# Patient Record
Sex: MALE | Race: WHITE | NOT HISPANIC OR LATINO | Employment: FULL TIME | URBAN - METROPOLITAN AREA
[De-identification: names, ages, dates, MRNs, and addresses within clinical notes are randomized per-mention and may not be internally consistent; named-entity substitution may affect disease eponyms.]

---

## 2019-11-18 ENCOUNTER — OFFICE VISIT - HEALTHEAST (OUTPATIENT)
Dept: FAMILY MEDICINE | Facility: CLINIC | Age: 34
End: 2019-11-18

## 2019-11-18 DIAGNOSIS — H93.13 TINNITUS, BILATERAL: ICD-10-CM

## 2019-11-18 DIAGNOSIS — Z02.4 ENCOUNTER FOR DEPARTMENT OF TRANSPORTATION (DOT) EXAMINATION FOR DRIVING LICENSE RENEWAL: ICD-10-CM

## 2019-11-18 LAB
ALBUMIN UR-MCNC: NEGATIVE MG/DL
APPEARANCE UR: CLEAR
BILIRUB UR QL STRIP: NEGATIVE
COLOR UR AUTO: YELLOW
GLUCOSE UR STRIP-MCNC: NEGATIVE MG/DL
HGB UR QL STRIP: NEGATIVE
KETONES UR STRIP-MCNC: NEGATIVE MG/DL
LEUKOCYTE ESTERASE UR QL STRIP: NEGATIVE
NITRATE UR QL: NEGATIVE
PH UR STRIP: 6.5 [PH] (ref 5–8)
SP GR UR STRIP: 1.02 (ref 1–1.03)
UROBILINOGEN UR STRIP-ACNC: NORMAL

## 2019-11-18 ASSESSMENT — MIFFLIN-ST. JEOR: SCORE: 1617.58

## 2020-03-25 ENCOUNTER — OFFICE VISIT - HEALTHEAST (OUTPATIENT)
Dept: FAMILY MEDICINE | Facility: CLINIC | Age: 35
End: 2020-03-25

## 2020-03-25 DIAGNOSIS — R10.13 DYSPEPSIA: ICD-10-CM

## 2020-03-25 DIAGNOSIS — K21.9 GASTROESOPHAGEAL REFLUX DISEASE WITHOUT ESOPHAGITIS: ICD-10-CM

## 2020-05-03 ENCOUNTER — OFFICE VISIT - HEALTHEAST (OUTPATIENT)
Dept: FAMILY MEDICINE | Facility: CLINIC | Age: 35
End: 2020-05-03

## 2020-05-03 DIAGNOSIS — F41.9 ANXIETY: ICD-10-CM

## 2020-05-03 ASSESSMENT — ANXIETY QUESTIONNAIRES
4. TROUBLE RELAXING: SEVERAL DAYS
2. NOT BEING ABLE TO STOP OR CONTROL WORRYING: SEVERAL DAYS
1. FEELING NERVOUS, ANXIOUS, OR ON EDGE: SEVERAL DAYS
6. BECOMING EASILY ANNOYED OR IRRITABLE: NOT AT ALL
5. BEING SO RESTLESS THAT IT IS HARD TO SIT STILL: SEVERAL DAYS
7. FEELING AFRAID AS IF SOMETHING AWFUL MIGHT HAPPEN: NOT AT ALL
3. WORRYING TOO MUCH ABOUT DIFFERENT THINGS: NOT AT ALL
GAD7 TOTAL SCORE: 4

## 2020-05-04 ENCOUNTER — COMMUNICATION - HEALTHEAST (OUTPATIENT)
Dept: SCHEDULING | Facility: CLINIC | Age: 35
End: 2020-05-04

## 2020-05-05 ENCOUNTER — OFFICE VISIT - HEALTHEAST (OUTPATIENT)
Dept: FAMILY MEDICINE | Facility: CLINIC | Age: 35
End: 2020-05-05

## 2020-05-05 ENCOUNTER — COMMUNICATION - HEALTHEAST (OUTPATIENT)
Dept: FAMILY MEDICINE | Facility: CLINIC | Age: 35
End: 2020-05-05

## 2020-05-05 DIAGNOSIS — F41.1 GENERALIZED ANXIETY DISORDER: ICD-10-CM

## 2020-05-05 DIAGNOSIS — K21.9 GASTROESOPHAGEAL REFLUX DISEASE WITHOUT ESOPHAGITIS: ICD-10-CM

## 2020-05-05 DIAGNOSIS — R10.13 DYSPEPSIA: ICD-10-CM

## 2020-05-05 DIAGNOSIS — F51.02 ADJUSTMENT INSOMNIA: ICD-10-CM

## 2020-05-05 RX ORDER — HYDROXYZINE HYDROCHLORIDE 50 MG/1
25-50 TABLET, FILM COATED ORAL 2 TIMES DAILY PRN
Qty: 30 TABLET | Refills: 2 | Status: SHIPPED | OUTPATIENT
Start: 2020-05-05 | End: 2021-12-22

## 2020-06-02 ENCOUNTER — OFFICE VISIT - HEALTHEAST (OUTPATIENT)
Dept: FAMILY MEDICINE | Facility: CLINIC | Age: 35
End: 2020-06-02

## 2020-06-02 DIAGNOSIS — J30.1 SEASONAL ALLERGIC RHINITIS DUE TO POLLEN: ICD-10-CM

## 2020-06-02 DIAGNOSIS — F51.01 PRIMARY INSOMNIA: ICD-10-CM

## 2020-06-02 DIAGNOSIS — K21.9 GASTROESOPHAGEAL REFLUX DISEASE WITHOUT ESOPHAGITIS: ICD-10-CM

## 2020-06-02 DIAGNOSIS — F41.1 GENERALIZED ANXIETY DISORDER: ICD-10-CM

## 2020-09-06 ENCOUNTER — COMMUNICATION - HEALTHEAST (OUTPATIENT)
Dept: FAMILY MEDICINE | Facility: CLINIC | Age: 35
End: 2020-09-06

## 2020-09-06 DIAGNOSIS — K21.9 GASTROESOPHAGEAL REFLUX DISEASE WITHOUT ESOPHAGITIS: ICD-10-CM

## 2020-09-06 DIAGNOSIS — R10.13 DYSPEPSIA: ICD-10-CM

## 2020-10-20 ENCOUNTER — OFFICE VISIT - HEALTHEAST (OUTPATIENT)
Dept: FAMILY MEDICINE | Facility: CLINIC | Age: 35
End: 2020-10-20

## 2020-10-20 DIAGNOSIS — J32.9 PURULENT POSTNASAL DRAINAGE: ICD-10-CM

## 2020-10-20 DIAGNOSIS — K21.9 GASTROESOPHAGEAL REFLUX DISEASE WITHOUT ESOPHAGITIS: ICD-10-CM

## 2020-10-20 DIAGNOSIS — F41.1 GENERALIZED ANXIETY DISORDER: ICD-10-CM

## 2020-11-02 ENCOUNTER — RECORDS - HEALTHEAST (OUTPATIENT)
Dept: ADMINISTRATIVE | Facility: OTHER | Age: 35
End: 2020-11-02

## 2021-01-09 ENCOUNTER — HEALTH MAINTENANCE LETTER (OUTPATIENT)
Age: 36
End: 2021-01-09

## 2021-05-28 ASSESSMENT — ANXIETY QUESTIONNAIRES: GAD7 TOTAL SCORE: 4

## 2021-06-03 VITALS
SYSTOLIC BLOOD PRESSURE: 110 MMHG | HEIGHT: 66 IN | TEMPERATURE: 98.3 F | BODY MASS INDEX: 26.03 KG/M2 | WEIGHT: 162 LBS | DIASTOLIC BLOOD PRESSURE: 66 MMHG | HEART RATE: 78 BPM | OXYGEN SATURATION: 98 % | RESPIRATION RATE: 14 BRPM

## 2021-06-03 NOTE — PROGRESS NOTES
"1. Encounter for Department of Transportation (DOT) examination for driving license renewal  Urinalysis Macroscopic   2. Tinnitus, bilateral  Ambulatory referral to Audiology         Plan: Qualifies for 2 year certificate. No restrictions.     Vision: uncorrected    Right: 20/16  Left:20/20  Both: 20/16  Horizontal FOV: 95 degrees bilaterally  Whisper test:  Passed over 5 feet bilaterally  Ishihara color test: passed    Subjective  Alireza Jung is a 34 y.o. male who presents for DOT physical.  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 Ringing in ears bilaterally for several years. He notices it more at night when he is trying to sleep. History of listening to loud music when he was a . He would like a referral to see a audiologist for formal hearing evaluation. Up to date with vaccines.  No signs are stable today, he has no other concerns.  Vision is uncorrected.  He is a social drinker, 2-4 drinks max per week.  He is a former cigar smoker who quit in 2014.  Denies any illicit drug use.  He takes no prescription medications.  He has no known allergies.  He currently has no primary care provider, his previous PCP retired.  He will look into establishing care with another provider or myself.     Review of Medical History:     Diabetes: No  Neuropathy: No  COPD: No  Asthma: No  Hernia: No  ADHD: No  Bipolar: No  Depression: No  Anxiety: No  Insomnia:  No  Allergies:  No  Sleep apnea: No  ETOH: socially  Drug Abuse: No  Smoker: Former smoking, quit 2014  Coumadin: No  Heart Disease: No  Atrial Fib: No  Hypertension: No  Pacemaker: No  ICD: No  Hearing aides: No  Seizures: No  Vertigo/Menieres: No   Narcolepsy: No            Objective   /66   Pulse 78   Temp 98.3  F (36.8  C)   Resp 14   Ht 5' 6\" (1.676 m)   Wt 162 lb (73.5 kg)   SpO2 98%   BMI 26.15 kg/m    No current outpatient medications on file.     No current facility-administered medications for this visit.  "       Gen: alert, no acute distress  Head;  Atraumatic, normalcephalic  Eyes:OP clear, pupils equal round reactive to light, fundi appear normal, extraocular movements intact  Ears: No scarring, fluid collection, perforation. Bilaterally TM pearly gray. External canals intact, no swelling or drainage.   Nose: No PND, no secretions  Mouth: No exudate, thrush or erythema.    Neck: supple, no lymphadenopathy, thyroid normal, full ROM  CV: RRR, no murmur, no carotid bruits, no gallop or rub, no edema. Femoral, pedal and post-tibial pulses intact, 2+  Resp: CTAB, no wheeze/crackles, no increased work of breathing  Abd: normal bowel sounds, soft, non-tender, non-distended, no masses, no CVA tenderness  : normal external male genitalia, no hernia.  Ext: warm, dry, no cyanosis  Neuro:  Reflexes normal and symmetric in bilateral upper and lower extremities, equal  strength, face symmetrical, negative Romberg,  strength equal  Skin: Warm and dry, no prior surgical scars, no lesions or rashes

## 2021-06-04 VITALS — BODY MASS INDEX: 27.12 KG/M2 | WEIGHT: 168 LBS

## 2021-06-04 VITALS — WEIGHT: 167 LBS | BODY MASS INDEX: 26.95 KG/M2

## 2021-06-07 NOTE — PROGRESS NOTES
"Alireza Jung is a 35 y.o. male who is being evaluated via a billable telephone visit.      The patient has been notified of following:     \"This telephone visit will be conducted via a call between you and your physician/provider. We have found that certain health care needs can be provided without the need for a physical exam.  This service lets us provide the care you need with a short phone conversation.  If a prescription is necessary we can send it directly to your pharmacy.  If lab work is needed we can place an order for that and you can then stop by our lab to have the test done at a later time.    If during the course of the call the physician/provider feels a telephone visit is not appropriate, you will not be charged for this service.\"         Alireza Jung complains of    Chief Complaint   Patient presents with     Gastroesophageal Reflux     Patient has been having acid reflux with heart burn for the past four months, recently getting worse. Feels like his stomach is sore. Other symptoms include nausea off and on and loose stools that were darker in color. No fevers. Patient wonders about stomach ulcers - having a stressful past few months (father passed away in Sept. 2019 and currently separting from his spouse).        I have reviewed and updated the patient's Past Medical History, Social History, Family History and Medication List.    ALLERGIES  Patient has no known allergies.    Additional provider notes: See below    Assessment/Plan:  1. Gastroesophageal reflux disease without esophagitis     - omeprazole (PRILOSEC) 20 MG capsule; Take 1 capsule (20 mg total) by mouth daily before breakfast.  Dispense: 45 capsule; Refill: 1  I talked to him about doing a stool collection for H. pylori antigen but he wanted to postpone that because he is fearful to come into the clinic and his try to quarantine himself.  Recommended eating small more frequent meals and to not eat for 2 hours prior to going to " bed.  Try to elevate the head of the bed 4 to 6 inches  If symptoms not improved in a week or if having more dark stools will need to do some additional testing.  The patient is aware that starting the PPI will interfere with some of the testing for H. pylori other than a upper endoscopy.  If he has recurrent symptoms like this when he goes off the PPI certainly doing a test for diagnosing H. pylori would be very important.      2. Dyspepsia     - omeprazole (PRILOSEC) 20 MG capsule; Take 1 capsule (20 mg total) by mouth daily before breakfast.  Dispense: 45 capsule; Refill: 1  Limit caffeine and alcohol      Phone call duration:  16 minutes    RAINA Rubio MD      Telephone encounter because of coronavirus pandemic.  Concerns for gastroesophageal reflux, nausea and on 1 or 2 occasions some dark stools.  Questions if he might have an ulcer.  Increased risk due to stress with the death of his father this past year and currently  from his spouse.  Alcohol use on average 8 beers per week has not noticed worsening of his symptoms with drinking alcohol  Caffeine 2 cups/day  Tobacco use none  Nonsteroidal anti-inflammatory medication use none  Remote history of similar symptoms treated with Prilosec with success.    Notices increased burping and sour taste in the back of his mouth  Burning type pain in the chest made worse after eating and also notices symptoms when he is supine  No shortness of breath  Patient has used some Pepto-Bismol did but did not feel there was a timing relationship between the use of Pepto-Bismol and the dark stools.  No dysphasia or pain with swallowing  Denies lightheadedness dizziness or feeling faint like.    Bobby Lord MD

## 2021-06-07 NOTE — PROGRESS NOTES
"Alireza Jung is a 35 y.o. male who is being evaluated via a billable video visit.      The patient has been notified of following:     \"This video visit will be conducted via a call between you and your physician/provider. We have found that certain health care needs can be provided without the need for an in-person physical exam.  This service lets us provide the care you need with a video conversation.  If a prescription is necessary we can send it directly to your pharmacy.  If lab work is needed we can place an order for that and you can then stop by our lab to have the test done at a later time.    Video visits are billed at different rates depending on your insurance coverage. Please reach out to your insurance provider with any questions.    If during the course of the call the physician/provider feels a video visit is not appropriate, you will not be charged for this service.\"    Patient has given verbal consent to a Video visit? Yes    Patient would like to receive their AVS by AVS Preference: Yvette.    Patient would like the video invitation sent by: Text to cell phone: [phone]     Will anyone else be joining your video visit? No        Video Start Time: 8:58    Additional provider notes:  ASSESSMENT/PLAN:       1. Generalized anxiety disorder     - sertraline (ZOLOFT) 50 MG tablet; Take 25 mg daily for first 4 days and then increase to 50 mg daily  Dispense: 30 tablet; Refill: 5  - hydrOXYzine HCL (ATARAX) 50 MG tablet; Take 0.5-1 tablets (25-50 mg total) by mouth 2 (two) times a day as needed for anxiety (and sleep).  Dispense: 30 tablet; Refill: 2  I shared with the patient the potential side effects from these medications and the reason for with the sertraline starting at a lower dose.  I explained to him that 50 mg of the sertraline is still low low average dose and may need to be increased.  He will try to limit his alcohol usage  Encouraged him to make contact again with the employee " assistance program as he did have 2 or 3 sessions with them.  If he desires to have other behavioral therapy counseling I be happy to help him with that.     2. Adjustment insomnia     - hydrOXYzine HCL (ATARAX) 50 MG tablet; Take 0.5-1 tablets (25-50 mg total) by mouth 2 (two) times a day as needed for anxiety (and sleep).  Dispense: 30 tablet; Refill: 2  The hydroxyzine can be taken at bedtime to help with sleep on a as needed basis.  Exercise could be helpful for his insomnia but if he does exercise to try to do that earlier in the day and not a few hours before he wants to go to sleep.    3. Gastroesophageal reflux disease without esophagitis     - omeprazole (PRILOSEC) 20 MG capsule; Take 1 capsule (20 mg total) by mouth daily before breakfast.  Dispense: 45 capsule; Refill: 1    4. Dyspepsia     - omeprazole (PRILOSEC) 20 MG capsule; Take 1 capsule (20 mg total) by mouth daily before breakfast.  Dispense: 45 capsule; Refill: 1    In the future if the patient needs to continue with omeprazole he may need to have an evaluation for H. pylori.    We will plan another virtual visit in about 4 weeks.    The following are part of a depression follow up plan for the patient:  seen by mental health counselor and mental health treatment assessment    Bobby Lord MD      PROGRESS NOTE   5/5/2020    SUBJECTIVE:  Alireza Jung is a 35 y.o. male  who presents for   Chief Complaint   Patient presents with     Anxiety     going through a seperation, having rising anxiety issues and not sleeping well        1. Generalized anxiety disorder  The patient is concerned about anxiety symptoms that he has been experiencing to be much worse over the last month.  He and his significant other of 10 years are  and they have a 5-year-old son together.  This will be coming to the completion here in the next few weeks and the patient is concerned that his anxiety will escalate further.  He does not feel that he has a chronic  problem with anxiety but more situational.  He is sleeping poorly and having a hard time with memory and concentration.  He does have some panic-like symptoms with emotional distress and occasionally some shortness of breath.  He is not had any suicidal thoughts or intent to hurt himself or anyone else.  His symptoms that he is experiencing started about 5 months ago.  Caffeine is 3 drinks per day  Alcohol 12 beers per week and he does agree that sometimes he will drink alcohol to help with anxiety and help him sleep but he realizes that in the end it really does not help.  In the 2008 the patient was having issues with anxiety and was treated with citalopram and Xanax but he felt the medication was too strong but it was helpful for his anxiety and he took it for possibly 3 months.  Once he got through that stressful situation he did not feel that it was needed.  The patient works at [employer] and [employer].    2. Adjustment insomnia  The patient has difficulties more with staying asleep then getting to sleep but occasionally has trouble with both.  He will often wake up about 130 or 2:00 and then have a hard time falling back to sleep.  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  He has taken on occasion he is significant other's hydroxyzine and that has helped with sleep and anxiety symptoms.    3. Gastroesophageal reflux disease without esophagitis  The patient has been on omeprazole for a couple months now and his symptoms are much improved.  He has tried to not take the medication for a couple days and he notices the symptoms returning.  He is requesting a refill on the omeprazole.      Patient Active Problem List   Diagnosis     Herpes Simplex     Male Infertility     Arthropathy Of Multiple Sites     Anxiety       Current Outpatient Medications   Medication Sig Dispense Refill     omeprazole (PRILOSEC) 20 MG capsule Take 1 capsule (20 mg total) by mouth daily before breakfast. 45 capsule 1     hydrOXYzine  HCL (ATARAX) 50 MG tablet Take 0.5-1 tablets (25-50 mg total) by mouth 2 (two) times a day as needed for anxiety (and sleep). 30 tablet 2     sertraline (ZOLOFT) 50 MG tablet Take 25 mg daily for first 4 days and then increase to 50 mg daily 30 tablet 5     No current facility-administered medications for this visit.        Social History     Tobacco Use   Smoking Status Never Smoker           OBJECTIVE:        No results found for this or any previous visit (from the past 240 hour(s)).    There were no vitals filed for this visit.  No data recorded        Physical Exam:     GENERAL: healthy, alert and no distress  EYES: Eyes grossly normal to inspection, conjunctivae and sclerae normal  RESP: no audible wheeze, cough, or visible cyanosis.  No visible retractions or increased work of breathing.  Able to speak fully in complete sentences.  NEURO: Cranial nerves grossly intact, mentation intact and speech normal  PSYCH: mentation appears normal, affect normal/bright, judgement and insight intact, normal speech and appearance well-groomed   The patient is casually dressed in his work clothing with good eye contact and normal speech pattern.  He seems to demonstrate a good range of affect with no emotional lability.  No psychomotor agitation.        Video-Visit Details    Type of service:  Video Visit    Video End Time (time video stopped): 9:19  Total video time with patient 21 minutes  Originating Location (pt. Location): Home    Distant Location (provider location):  [facility]     Platform used for Video Visit: Jorge Lord MD

## 2021-06-07 NOTE — TELEPHONE ENCOUNTER
Left message to call back for: Pt.  Information to relay to patient:  Please help patient schedule a video visit in 4 weeks for a follow up with Dr. Lord.

## 2021-06-11 NOTE — TELEPHONE ENCOUNTER
Refill Approved    Rx renewed per Medication Renewal Policy. Medication was last renewed on 5/5/20.    Muriel Teague, [facility] Connection Triage/Med Refill 9/8/2020     Requested Prescriptions   Pending Prescriptions Disp Refills     omeprazole (PRILOSEC) 20 MG capsule [Pharmacy Med Name: OMEPRAZOLE 20MG CAPSULES] 45 capsule 1     Sig: TAKE 1 CAPSULE(20 MG) BY MOUTH DAILY BEFORE BREAKFAST       GI Medications Refill Protocol Passed - 9/6/2020 12:05 PM        Passed - PCP or prescribing provider visit in last 12 or next 3 months.     Last office visit with prescriber/PCP: Visit date not found OR same dept: 11/18/2019 Venecia Del Valle NP OR same specialty: 11/18/2019 Venecia Del Valle NP  Last physical: Visit date not found Last MTM visit: Visit date not found   Next visit within 3 mo: Visit date not found  Next physical within 3 mo: Visit date not found  Prescriber OR PCP: Bobby Lord MD  Last diagnosis associated with med order: 1. Gastroesophageal reflux disease without esophagitis  - omeprazole (PRILOSEC) 20 MG capsule [Pharmacy Med Name: OMEPRAZOLE 20MG CAPSULES]; TAKE 1 CAPSULE(20 MG) BY MOUTH DAILY BEFORE BREAKFAST  Dispense: 45 capsule; Refill: 1    2. Dyspepsia  - omeprazole (PRILOSEC) 20 MG capsule [Pharmacy Med Name: OMEPRAZOLE 20MG CAPSULES]; TAKE 1 CAPSULE(20 MG) BY MOUTH DAILY BEFORE BREAKFAST  Dispense: 45 capsule; Refill: 1    If protocol passes may refill for 12 months if within 3 months of last provider visit (or a total of 15 months).

## 2021-06-12 NOTE — PROGRESS NOTES
"Alireza Jung is a 35 y.o. male who is being evaluated via a billable video visit.      The patient has been notified of following:     \"This video visit will be conducted via a call between you and your physician/provider. We have found that certain health care needs can be provided without the need for an in-person physical exam.  This service lets us provide the care you need with a video conversation.  If a prescription is necessary we can send it directly to your pharmacy.  If lab work is needed we can place an order for that and you can then stop by our lab to have the test done at a later time.    Video visits are billed at different rates depending on your insurance coverage. Please reach out to your insurance provider with any questions.    If during the course of the call the physician/provider feels a video visit is not appropriate, you will not be charged for this service.\"    Patient has given verbal consent to a Video visit? Yes  How would you like to obtain your AVS? AVS Preference: MyChart.  If dropped by the video visit, the video invitation should be sent to: Text to cell phone: [phone]   Will anyone else be joining your video visit? No        Video Start Time: 4:00    Additional provider notes:   ASSESSMENT/PLAN:       1. Gastroesophageal reflux disease without esophagitis    - Ambulatory Referral for Upper GI Endoscopy  Because of his persistent epigastric burning that radiates up into the chest along with some soft symptoms of dysphagia and early satiety with symptoms being worse after eating I like to have the patient seen for an EGD.  For now the patient will continue on omeprazole 20 mg every morning    2. Generalized anxiety disorder  The patient's anxiety seems to be improved and we will not add any additional medicine at this point.  Of note is that patient did not tolerate sertraline very well    3.  Postnasal drainage  With a video visit difficult to assess this sensation of " postnasal drainage.  Certainly if the symptoms persist and there is no etiology found from the EGD may need to consider a referral to ENT.  The patient should cut back on caffeine to 1 drink a day and limit alcohol.  Try not to eat for 2 hours prior to going to bed however note that patient really does not have symptoms through the night    Appreciate GI evaluation and recommendations    Bobby Lord MD      PROGRESS NOTE   10/22/2020    SUBJECTIVE:  Alireza Jung is a 35 y.o. male  who presents for   Chief Complaint   Patient presents with     GI Problem     patient is having lots of burning and pain while on the medication in chest, and abdomen       Patient has been on a proton pump inhibitor in the form of omeprazole 20 mg first thing in the morning since March 25 of 2020.  The patient's symptoms just have not improved as expected.  1. Gastroesophageal reflux disease without esophagitis  The patient continues to have a burning sensation in the upper epigastric area and also into the chest.  His symptoms are worse after eating.  He does not have trouble with pain during sleep.  The patient has regular bowel movements.  He has a feeling in his throat is if there is phlegm present and at times feels like it is difficult to swallow but not to the point where food gets stuck.  He does feel that he feels up easily and quickly.  Caffeine is 1 to 2 cups of coffee and 1 soda a day.  No nicotine.  2-3 alcoholic drinks per week and he does not notice symptoms any worse after the use of alcohol.    2. Generalized anxiety disorder  .  Patient had been started on sertraline 50 mg daily and after a few days the patient stopped taking the sertraline because of feeling foggy and having difficulty with memory.  He feels that his anxiety has improved without the need for additional medication.  He does have a prescription for hydroxyzine and occasionally he will take a fourth of a tablet nightly for sleep.    3. Postnasal  drainage  With a feeling of drainage in the back of his throat can be brought up by clearing his throat but not sure what color it is.  He does not have much drainage from his nose pressure in his sinuses.  He did try to use Flonase for more than a week and did not seem to help with his symptoms.  Patient Active Problem List   Diagnosis     Herpes Simplex     Male Infertility     Arthropathy Of Multiple Sites     Anxiety       Current Outpatient Medications   Medication Sig Dispense Refill     hydrOXYzine HCL (ATARAX) 50 MG tablet Take 0.5-1 tablets (25-50 mg total) by mouth 2 (two) times a day as needed for anxiety (and sleep). 30 tablet 2     omeprazole (PRILOSEC) 20 MG capsule TAKE 1 CAPSULE(20 MG) BY MOUTH DAILY BEFORE BREAKFAST 45 capsule 2     triamcinolone (NASACORT) 55 mcg nasal inhaler 2 sprays each side of the nose daily for allergy symptoms 1 Inhaler 12     No current facility-administered medications for this visit.        Social History     Tobacco Use   Smoking Status Never Smoker   Smokeless Tobacco Never Used           OBJECTIVE:        No results found for this or any previous visit (from the past 240 hour(s)).    There were no vitals filed for this visit.  No data recorded        Physical Exam:    GENERAL: Healthy, alert and no distress  EYES: Eyes grossly normal to inspection. No discharge or erythema, or obvious scleral/conjunctival abnormalities.  RESP: No audible wheeze, cough, or visible cyanosis.  No visible retractions or increased work of breathing.  Note that there is no hoarseness with his voice  NEURO: Cranial nerves grossly intact. Mentation and speech appropriate for age.  PSYCH: Mentation appears normal, affect normal/bright, judgement and insight intact, normal speech and appearance well-groomed      Video-Visit Details    Type of service:  Video Visit    Video End Time (time video stopped): 4:20  Originating Location (pt. Location): Home    Distant Location (provider location):  [facility]     Platform used for Video Visit: Jorge Lord MD

## 2021-09-28 ENCOUNTER — APPOINTMENT (OUTPATIENT)
Dept: URGENT CARE | Facility: URGENT CARE | Age: 36
End: 2021-09-28
Payer: COMMERCIAL

## 2021-09-28 ENCOUNTER — LAB REQUISITION (OUTPATIENT)
Dept: LAB | Facility: CLINIC | Age: 36
End: 2021-09-28

## 2021-09-28 PROCEDURE — U0005 INFEC AGEN DETEC AMPLI PROBE: HCPCS | Performed by: INTERNAL MEDICINE

## 2021-09-29 LAB — SARS-COV-2 RNA RESP QL NAA+PROBE: NEGATIVE

## 2021-10-31 ENCOUNTER — HEALTH MAINTENANCE LETTER (OUTPATIENT)
Age: 36
End: 2021-10-31

## 2021-12-22 ENCOUNTER — ANCILLARY PROCEDURE (OUTPATIENT)
Dept: GENERAL RADIOLOGY | Facility: CLINIC | Age: 36
End: 2021-12-22
Attending: PHYSICIAN ASSISTANT
Payer: COMMERCIAL

## 2021-12-22 ENCOUNTER — OFFICE VISIT (OUTPATIENT)
Dept: FAMILY MEDICINE | Facility: CLINIC | Age: 36
End: 2021-12-22
Payer: COMMERCIAL

## 2021-12-22 ENCOUNTER — VIRTUAL VISIT (OUTPATIENT)
Dept: FAMILY MEDICINE | Facility: CLINIC | Age: 36
End: 2021-12-22
Payer: COMMERCIAL

## 2021-12-22 VITALS
TEMPERATURE: 98.5 F | RESPIRATION RATE: 18 BRPM | HEART RATE: 79 BPM | WEIGHT: 183 LBS | BODY MASS INDEX: 29.54 KG/M2 | SYSTOLIC BLOOD PRESSURE: 123 MMHG | OXYGEN SATURATION: 97 % | DIASTOLIC BLOOD PRESSURE: 89 MMHG

## 2021-12-22 DIAGNOSIS — R07.89 CHEST PRESSURE: ICD-10-CM

## 2021-12-22 DIAGNOSIS — J02.9 SORE THROAT: ICD-10-CM

## 2021-12-22 DIAGNOSIS — M79.672 FOOT PAIN, LEFT: ICD-10-CM

## 2021-12-22 DIAGNOSIS — L21.9 SEBORRHEIC DERMATITIS: ICD-10-CM

## 2021-12-22 DIAGNOSIS — R06.09 DOE (DYSPNEA ON EXERTION): ICD-10-CM

## 2021-12-22 DIAGNOSIS — L28.2 PRURITIC RASH: Primary | ICD-10-CM

## 2021-12-22 DIAGNOSIS — L50.9 URTICARIA: ICD-10-CM

## 2021-12-22 DIAGNOSIS — R07.89 CHEST PRESSURE: Primary | ICD-10-CM

## 2021-12-22 DIAGNOSIS — R06.02 SOB (SHORTNESS OF BREATH): ICD-10-CM

## 2021-12-22 LAB — TROPONIN I SERPL-MCNC: <0.01 NG/ML (ref 0–0.29)

## 2021-12-22 PROCEDURE — 99213 OFFICE O/P EST LOW 20 MIN: CPT | Mod: 95 | Performed by: FAMILY MEDICINE

## 2021-12-22 PROCEDURE — 36415 COLL VENOUS BLD VENIPUNCTURE: CPT | Performed by: PHYSICIAN ASSISTANT

## 2021-12-22 PROCEDURE — U0003 INFECTIOUS AGENT DETECTION BY NUCLEIC ACID (DNA OR RNA); SEVERE ACUTE RESPIRATORY SYNDROME CORONAVIRUS 2 (SARS-COV-2) (CORONAVIRUS DISEASE [COVID-19]), AMPLIFIED PROBE TECHNIQUE, MAKING USE OF HIGH THROUGHPUT TECHNOLOGIES AS DESCRIBED BY CMS-2020-01-R: HCPCS | Performed by: PHYSICIAN ASSISTANT

## 2021-12-22 PROCEDURE — 71046 X-RAY EXAM CHEST 2 VIEWS: CPT | Mod: TC | Performed by: RADIOLOGY

## 2021-12-22 PROCEDURE — U0005 INFEC AGEN DETEC AMPLI PROBE: HCPCS | Performed by: PHYSICIAN ASSISTANT

## 2021-12-22 PROCEDURE — 99214 OFFICE O/P EST MOD 30 MIN: CPT | Performed by: PHYSICIAN ASSISTANT

## 2021-12-22 PROCEDURE — 84484 ASSAY OF TROPONIN QUANT: CPT | Performed by: PHYSICIAN ASSISTANT

## 2021-12-22 PROCEDURE — 93005 ELECTROCARDIOGRAM TRACING: CPT | Performed by: PHYSICIAN ASSISTANT

## 2021-12-22 PROCEDURE — 93010 ELECTROCARDIOGRAM REPORT: CPT | Performed by: INTERNAL MEDICINE

## 2021-12-22 RX ORDER — ACETAMINOPHEN 500 MG
500-1000 TABLET ORAL EVERY 6 HOURS PRN
COMMUNITY

## 2021-12-22 RX ORDER — SELENIUM SULFIDE 2.5 MG/100ML
LOTION TOPICAL DAILY PRN
Start: 2021-12-22 | End: 2022-01-14

## 2021-12-22 RX ORDER — HYDROXYZINE HYDROCHLORIDE 25 MG/1
25 TABLET, FILM COATED ORAL EVERY 6 HOURS PRN
Qty: 40 TABLET | Refills: 0 | Status: SHIPPED | OUTPATIENT
Start: 2021-12-22 | End: 2022-02-21

## 2021-12-22 RX ORDER — PREDNISONE 20 MG/1
40 TABLET ORAL ONCE
Status: COMPLETED | OUTPATIENT
Start: 2021-12-22 | End: 2021-12-22

## 2021-12-22 RX ORDER — PREDNISONE 20 MG/1
40 TABLET ORAL DAILY
Qty: 8 TABLET | Refills: 0 | Status: SHIPPED | OUTPATIENT
Start: 2021-12-22 | End: 2021-12-26

## 2021-12-22 RX ORDER — TRIAMCINOLONE ACETONIDE 1 MG/G
1 OINTMENT TOPICAL 2 TIMES DAILY
COMMUNITY
End: 2022-01-14

## 2021-12-22 RX ORDER — DIPHENHYDRAMINE HCL 25 MG
50 CAPSULE ORAL ONCE
Status: COMPLETED | OUTPATIENT
Start: 2021-12-22 | End: 2021-12-22

## 2021-12-22 RX ADMIN — Medication 50 MG: at 19:51

## 2021-12-22 RX ADMIN — PREDNISONE 40 MG: 20 TABLET ORAL at 19:52

## 2021-12-22 ASSESSMENT — ENCOUNTER SYMPTOMS
FACIAL SWELLING: 0
NEUROLOGICAL NEGATIVE: 1
PSYCHIATRIC NEGATIVE: 1
EYES NEGATIVE: 1
PALPITATIONS: 0
DIAPHORESIS: 0
CHILLS: 0
SORE THROAT: 1
SHORTNESS OF BREATH: 1
ALLERGIC/IMMUNOLOGIC NEGATIVE: 1
HEMATOLOGIC/LYMPHATIC NEGATIVE: 1
SINUS PAIN: 0
ENDOCRINE NEGATIVE: 1
APPETITE CHANGE: 0
MUSCULOSKELETAL NEGATIVE: 1
GASTROINTESTINAL NEGATIVE: 1
FATIGUE: 1

## 2021-12-22 NOTE — PROGRESS NOTES
Alireza is a 36 year old who is being evaluated via a billable video visit.      How would you like to obtain your AVS? MyChart  If the video visit is dropped, the invitation should be resent by: Text to cell phone: [phone]  Will anyone else be joining your video visit? No      Video Start Time: 4:21    Assessment & Plan     Pruritic rash  Over the last 24 hours the patient has had a progressive spreading pruritic rash that he describes as being raised and could be consistent with the urticaria.  He has not taken any antihistamines.    Foot pain, left  The patient could not see a definite rash on the bottom of his left foot but it seems somewhat swollen he says and it hurts to put weight on his left foot    CRUZ (dyspnea on exertion)  Significant shortness of breath with going up and down the stairs once    Chest pressure  Is noticed a substernal mid chest pressure which radiates up towards the neck but not down the arm    Sore throat  Sore throat today moderate with questionable exudate and no reported closure of the posterior pharynx on inspection by the patient    Seborrheic dermatitis  This was the diagnosis at urgency room yesterday and the selenium sulfide was prescribed  Also was given triamcinolone ointment 0.1% for diagnosis of eczema    - selenium sulfide (SELSUN) 2.5 % external lotion      Review of prior external note(s) from - CareEverywhere information from Urgency room [place] 12/21/2021 reviewed  22 minutes spent on the date of the encounter doing chart review, history and exam, documentation and further activities per the note  The medical record from emergency room yesterday was reviewed in care everywhere       Suggested visit tonight at walk-in clinic [facility] or the [place] emergency room     Return if symptoms worsen or fail to improve, 1 week, for Follow up.    Bobby Lord MD  [facility]    Jazmin Lay is a 36 year old who presents for the following  health issues     HPI   Video visit today because the patient's symptoms are worse than yesterday.  Symptoms started yesterday with a rash primarily on the scalp lower abdomen and now has spread to the thighs genital area as well as the palms of the hands and the bottom of the feet.  The rash is very pruritic and interfered with his ability to sleep last night.  The rash seems to come and go definitely becomes worse if he scratches it and is somewhat raised.  Today now he has developed a sore throat that is moderate I had him look at the back of his throat and he thought he could see a few white patches but not terribly swollen.  He has chest pressure substernal radiates some towards the neck but not down the arms.  No shortness of breath at rest but if he exerts himself he is short of breath and just in general feels very tired today.  He did sleep poorly last night because of the itching  Temperature 99.4  No cough  The rash on the bottom of his left foot is painful to wear bearing weight hurts and he limps  No new medications    Was evaluated at the emergency room at [place] yesterday and was told that he thought he had eczema and he was given a lotion for his scalp and some triamcinolone 0.1% ointment for the rash.    He does not feel like there is swelling in his mouth or that it is difficult to swallow.  He does not have a history of other allergies.    No other tests were done at the urgency room yesterday       Review of Systems   Constitutional: Positive for fatigue. Negative for appetite change, chills and diaphoresis.   HENT: Positive for sore throat. Negative for drooling, ear pain, facial swelling and sinus pain.    Eyes: Negative.    Respiratory: Positive for shortness of breath.         Chest pressure midsternal   Cardiovascular: Negative for palpitations and peripheral edema.   Gastrointestinal: Negative.    Endocrine: Negative.    Genitourinary: Negative.    Musculoskeletal: Negative.    Skin:  "Positive for rash.   Allergic/Immunologic: Negative.    Neurological: Negative.    Hematological: Negative.    Psychiatric/Behavioral: Negative.             Objective    Vitals - Patient Reported  Weight (Patient Reported): 78 kg (172 lb)  Height (Patient Reported): 170.2 cm (5' 7\")  BMI (Based on Pt Reported Ht/Wt): 26.94      Vitals:  No vitals were obtained today due to virtual visit.    Physical Exam   GENERAL: alert, fatigued and the patient was noticeably short of breath after he returned from having to go upstairs to get a flashlight to look in his throat.  He was noticeably uncomfortable with his skin being very itchy  EYES: Eyes grossly normal to inspection.  No discharge or erythema, or obvious scleral/conjunctival abnormalities.  RESP: No audible wheeze, cough, or visible cyanosis.  No visible retractions or increased work of breathing.    RESP: The patient did not have any audible wheezing  SKIN: no suspicious lesions or rashes and with the video visit it was very difficult to evaluate the rash with good resolution  NEURO: Cranial nerves grossly intact.  Mentation and speech appropriate for age.  PSYCH: Seem distressed and uncomfortable                Video-Visit Details    Type of service:  Video Visit    Video End Time:4:38    Originating Location (pt. Location): Home    Distant Location (provider location):  [facility]     Platform used for Video Visit: Jorge  "

## 2021-12-23 LAB
ATRIAL RATE - MUSE: 80 BPM
DIASTOLIC BLOOD PRESSURE - MUSE: NORMAL MMHG
INTERPRETATION ECG - MUSE: NORMAL
P AXIS - MUSE: 14 DEGREES
PR INTERVAL - MUSE: 118 MS
QRS DURATION - MUSE: 92 MS
QT - MUSE: 354 MS
QTC - MUSE: 408 MS
R AXIS - MUSE: 41 DEGREES
SARS-COV-2 RNA RESP QL NAA+PROBE: NEGATIVE
SYSTOLIC BLOOD PRESSURE - MUSE: NORMAL MMHG
T AXIS - MUSE: 24 DEGREES
VENTRICULAR RATE- MUSE: 80 BPM

## 2021-12-23 NOTE — PROGRESS NOTES
"Assessment & Plan     Chest pressure  Reassured of normal troponin, cxr, ecg.  Lungs clear, and vitally stable. No tachypnea, normal HR, normal sats.  I suspect related to GERD but can not exclude early viral syndrome. I encouraged him to restart his omeprazole and Follow-up with pcp   - EKG 12-lead, tracing only  - Troponin I  - Troponin I  - XR Chest 2 Views    Urticaria  Etiology unclear.  PI given and discussed.  Given prednisone and benadryl here in clinic.  Meds for home use including prednisone and atarax.    Keep cool, avoid irritants.  Follow-up prn.    - predniSONE (DELTASONE) tablet 40 mg  - diphenhydrAMINE (BENADRYL) capsule 50 mg  - hydrOXYzine (ATARAX) 25 MG tablet  Dispense: 40 tablet; Refill: 0  - predniSONE (DELTASONE) 20 MG tablet  Dispense: 8 tablet; Refill: 0    SOB (shortness of breath)  Reassured pt. Lungs clear, normal cardiac exam, troponin, ecg.  Normal sats, normal CXR.    He is not tachypnic in exam room and has no difficulty talking in full sentences, no wheezing. reassess if persistent or worsening with pcp.  Less likely related to urticaria as sx x 2 days, normal vitals, though prednisone should be helpful       - XR Chest 2 Views  - Symptomatic; Unknown COVID-19 Virus (Coronavirus) by PCR Nose    EFRAIN Ochoa [facility]    Jazmin Lay is a 36 year old male who presents to clinic today for the following health issues:  Chief Complaint   Patient presents with     rashes     chest heaviness     sick x since yesterday     HPI  Started with pruritic rash progressively worsening over the last 24 hours.    This am feels \"heavy lungs\",  throat feels swollen.   Has anterior chest pressure the last 2 days.  Not exertional but does feel a bit winded going up and down stairs.   4-5 month hx of abdomen symptoms with GERD on Omeprazole initially but then stopped that and his sx recurred.  Concerned about what is causing that   Denies any cardiac " history.  No FH of premature  heart disease.  No known exposures to foods, meds, contact irritants. No food allergies.  No hx of asthma. No wheezing.  No sob at rest.  Pain is not pleuritic.  No hemoptysis, no recent surgery or hospitalization.  No trauma.  Hx of DVT/PE          Review of Systems  Constitutional, HEENT, cardiovascular, pulmonary, gi and gu systems are negative, except as otherwise noted.    Patient Active Problem List   Diagnosis     Herpes Simplex     Male Infertility     Arthropathy Of Multiple Sites     Anxiety           Objective    /89   Pulse 79   Temp 98.5  F (36.9  C)   Resp 18   Wt 83 kg (183 lb)   SpO2 97%   BMI 29.54 kg/m    Physical Exam     Pt is in no acute distress and appears well  Ears patent B:  TM s intact, non-injected. All land marks easily visibile    Nasal mucosa is non-edematous, no discharge.    Pharynx: non erythematous, tonsils non hypertrophied, No exudate   Neck supple: no adenopathy  Lungs: CTA  Heart: RRR, no murmur, no thrills or heaves   Ext: no edema  Skin: urticarial rash face, neck, axilla, inguinal area. Positive dermatographism throughout the remainder of the body including back, UE, LE.    EXG: NSR, no ectopy, no ST elevation or depression.   Rate 80  Normal QT/QTC    CXR:  Per my independent evaluation negative for infiltrate, effusion, PTX, normal CXR     Results for orders placed or performed in visit on 12/22/21   Troponin I     Status: Normal   Result Value Ref Range    Troponin I <0.01 0.00 - 0.29 ng/mL   EKG 12-lead, tracing only     Status: None (Preliminary result)   Result Value Ref Range    Systolic Blood Pressure  mmHg    Diastolic Blood Pressure  mmHg    Ventricular Rate 80 BPM    Atrial Rate 80 BPM    NY Interval 118 ms    QRS Duration 92 ms     ms    QTc 408 ms    P Axis 14 degrees    R AXIS 41 degrees    T Axis 24 degrees    Interpretation ECG       Sinus rhythm  Normal ECG  No previous ECGs available           Perc rule : 0   (no d-dimer done, pt is very low risk for PE.

## 2021-12-23 NOTE — PATIENT INSTRUCTIONS
Patient Education     Hives (Adult)  Hives are pink or red bumps on the skin. These bumps are also known as wheals. The bumps can itch, burn, or sting. Hives can occur anywhere on the body. They vary in size and shape and can form in clusters. Individual hives can appear and go away quickly. New hives may develop as old ones fade. Hives are common and usually harmless. They are not contagious. Occasionally, hives are a sign of a serious allergy.   Hives are often caused by an allergic reaction. They may occur from:     Certain foods, such as shellfish, nuts, tomatoes, or berries    Contact with something in the environment, such as pollens, animals, or mold    Certain medicines    Sun or cold air    Viral infections, such as a cold, the flu, or strep throat  If the hives continue to come and go over many weeks without any other symptoms (chronic hives), the cause may be very hard to figure out.   You may be prescribed medicines to ease swelling and itching. Follow all instructions when using these medicines. The hives will usually fade in a few days. But they can last for weeks or months.   Home care   Follow these tips:    Try to find the cause of the hives and eliminate it. Discuss possible causes with your healthcare provider. Your healthcare provider may ask you to keep track of the food you eat and your lifestyle to help find the cause of the hives.    Don t scratch the hives. Scratching will delay healing. To reduce itching, apply cool, wet compresses to the skin.    Dress in soft, loose cotton clothing.    Don t bathe in hot water. This can make the itching worse.    Apply an ice pack or cool pack wrapped in a thin towel to your skin. This will help reduce redness and itching. But if your hives were caused by exposure to cold, then do not apply more cold to them.    You may use over-the counter antihistamines to reduce itching. Some older antihistamines, such as diphenhydramine and chlorpheniramine, are  inexpensive. But they need to be taken often and may make you sleepy. They are best used at bedtime. Don t use diphenhydramine if you have glaucoma or have trouble urinating because of an enlarged prostate. Newer antihistamines, such as loratadine, cetirizine, levocetirizine, and fexofenadine, are generally more expensive. But they tend to have fewer side effects. They can be taken less often.    Another type of antihistamine is used to treat heartburn. This type includes nizatidine, famotidine, and cimetidine. These are sometimes used along with the above antihistamines if a single medicine is not working.    If the hives are severe and you do not respond well to other medicines, you may be given a steroid, such as prednisone, to take for a short time. Follow all instructions carefully when taking this medicine. Tell your healthcare provider about any side effects.  Follow-up care   Follow up with your healthcare provider if your symptoms don't get better in 2 days. Ask your provider about allergy testing if you have had a severe reaction or have had several episodes of hives. Allergy testing may help figure out what you are allergic to. You may need blood tests, a urine test, or skin tests.   When to seek medical advice   Call your healthcare provider right away if any of these occur:     Fever of 100.4 F (38.0 C) or higher, or as directed by your healthcare provider    Redness, swelling, or pain    Foul-smelling fluid coming from the rash  Call 911  Call 911 if any of the following occur:     Swelling of the face, throat, or tongue    Trouble breathing or swallowing    Dizziness, weakness, or fainting  [employer] last reviewed this educational content on 6/1/2019 2000-2021 [employer]. All rights reserved. This information is not intended as a substitute for professional medical care. Always follow your healthcare professional's instructions.    Your chest symtpoms likely due to your hx of GERD,  but can not exclude early viral syndrome.  Reassured by normal CXR, EKG and troponin.  Please follow up with your primary care provider to further discuss your GERD sx.  Restart your omeprazole please.

## 2021-12-27 ENCOUNTER — MYC MEDICAL ADVICE (OUTPATIENT)
Dept: FAMILY MEDICINE | Facility: CLINIC | Age: 36
End: 2021-12-27
Payer: COMMERCIAL

## 2021-12-27 DIAGNOSIS — R06.2 WHEEZING: Primary | ICD-10-CM

## 2021-12-29 RX ORDER — ALBUTEROL SULFATE 90 UG/1
2 AEROSOL, METERED RESPIRATORY (INHALATION) EVERY 4 HOURS PRN
Qty: 18 G | Refills: 1 | Status: SHIPPED | OUTPATIENT
Start: 2021-12-29

## 2022-01-14 ENCOUNTER — OFFICE VISIT (OUTPATIENT)
Dept: FAMILY MEDICINE | Facility: CLINIC | Age: 37
End: 2022-01-14
Payer: COMMERCIAL

## 2022-01-14 VITALS
WEIGHT: 181 LBS | DIASTOLIC BLOOD PRESSURE: 88 MMHG | OXYGEN SATURATION: 100 % | HEART RATE: 91 BPM | HEIGHT: 67 IN | BODY MASS INDEX: 28.41 KG/M2 | SYSTOLIC BLOOD PRESSURE: 124 MMHG

## 2022-01-14 DIAGNOSIS — R06.2 WHEEZING: ICD-10-CM

## 2022-01-14 DIAGNOSIS — B00.9 HSV-2 (HERPES SIMPLEX VIRUS 2) INFECTION: ICD-10-CM

## 2022-01-14 DIAGNOSIS — Z13.1 SCREENING FOR DIABETES MELLITUS: ICD-10-CM

## 2022-01-14 DIAGNOSIS — K21.00 GASTROESOPHAGEAL REFLUX DISEASE WITH ESOPHAGITIS WITHOUT HEMORRHAGE: ICD-10-CM

## 2022-01-14 DIAGNOSIS — Z11.59 ENCOUNTER FOR HEPATITIS C SCREENING TEST FOR LOW RISK PATIENT: ICD-10-CM

## 2022-01-14 DIAGNOSIS — Z00.00 ROUTINE GENERAL MEDICAL EXAMINATION AT A HEALTH CARE FACILITY: Primary | ICD-10-CM

## 2022-01-14 DIAGNOSIS — Z13.220 LIPID SCREENING: ICD-10-CM

## 2022-01-14 DIAGNOSIS — L50.9 URTICARIA: ICD-10-CM

## 2022-01-14 LAB
ALBUMIN SERPL-MCNC: 4.2 G/DL (ref 3.5–5)
ALP SERPL-CCNC: 97 U/L (ref 45–120)
ALT SERPL W P-5'-P-CCNC: 33 U/L (ref 0–45)
ANION GAP SERPL CALCULATED.3IONS-SCNC: 13 MMOL/L (ref 5–18)
AST SERPL W P-5'-P-CCNC: 17 U/L (ref 0–40)
BASOPHILS # BLD AUTO: 0 10E3/UL (ref 0–0.2)
BASOPHILS NFR BLD AUTO: 0 %
BILIRUB SERPL-MCNC: 0.5 MG/DL (ref 0–1)
BUN SERPL-MCNC: 11 MG/DL (ref 8–22)
CALCIUM SERPL-MCNC: 9.8 MG/DL (ref 8.5–10.5)
CHLORIDE BLD-SCNC: 103 MMOL/L (ref 98–107)
CHOLEST SERPL-MCNC: 235 MG/DL
CO2 SERPL-SCNC: 23 MMOL/L (ref 22–31)
CREAT SERPL-MCNC: 1.06 MG/DL (ref 0.7–1.3)
EOSINOPHIL # BLD AUTO: 0.1 10E3/UL (ref 0–0.7)
EOSINOPHIL NFR BLD AUTO: 2 %
ERYTHROCYTE [DISTWIDTH] IN BLOOD BY AUTOMATED COUNT: 13.7 % (ref 10–15)
FASTING STATUS PATIENT QL REPORTED: NO
GFR SERPL CREATININE-BSD FRML MDRD: >90 ML/MIN/1.73M2
GLUCOSE BLD-MCNC: 90 MG/DL (ref 70–125)
HCT VFR BLD AUTO: 49 % (ref 40–53)
HDLC SERPL-MCNC: 46 MG/DL
HGB BLD-MCNC: 16.1 G/DL (ref 13.3–17.7)
IMM GRANULOCYTES # BLD: 0 10E3/UL
IMM GRANULOCYTES NFR BLD: 0 %
LDLC SERPL CALC-MCNC: 155 MG/DL
LYMPHOCYTES # BLD AUTO: 2.1 10E3/UL (ref 0.8–5.3)
LYMPHOCYTES NFR BLD AUTO: 27 %
MCH RBC QN AUTO: 26.4 PG (ref 26.5–33)
MCHC RBC AUTO-ENTMCNC: 32.9 G/DL (ref 31.5–36.5)
MCV RBC AUTO: 80 FL (ref 78–100)
MONOCYTES # BLD AUTO: 0.6 10E3/UL (ref 0–1.3)
MONOCYTES NFR BLD AUTO: 7 %
NEUTROPHILS # BLD AUTO: 5 10E3/UL (ref 1.6–8.3)
NEUTROPHILS NFR BLD AUTO: 64 %
PLATELET # BLD AUTO: 307 10E3/UL (ref 150–450)
POTASSIUM BLD-SCNC: 4.6 MMOL/L (ref 3.5–5)
PROT SERPL-MCNC: 7.6 G/DL (ref 6–8)
RBC # BLD AUTO: 6.11 10E6/UL (ref 4.4–5.9)
SODIUM SERPL-SCNC: 139 MMOL/L (ref 136–145)
TRIGL SERPL-MCNC: 170 MG/DL
WBC # BLD AUTO: 7.8 10E3/UL (ref 4–11)

## 2022-01-14 PROCEDURE — 80053 COMPREHEN METABOLIC PANEL: CPT | Performed by: FAMILY MEDICINE

## 2022-01-14 PROCEDURE — 80061 LIPID PANEL: CPT | Performed by: FAMILY MEDICINE

## 2022-01-14 PROCEDURE — 99213 OFFICE O/P EST LOW 20 MIN: CPT | Mod: 25 | Performed by: FAMILY MEDICINE

## 2022-01-14 PROCEDURE — 85025 COMPLETE CBC W/AUTO DIFF WBC: CPT | Performed by: FAMILY MEDICINE

## 2022-01-14 PROCEDURE — 99395 PREV VISIT EST AGE 18-39: CPT | Performed by: FAMILY MEDICINE

## 2022-01-14 PROCEDURE — 36415 COLL VENOUS BLD VENIPUNCTURE: CPT | Performed by: FAMILY MEDICINE

## 2022-01-14 PROCEDURE — 86803 HEPATITIS C AB TEST: CPT | Performed by: FAMILY MEDICINE

## 2022-01-14 RX ORDER — FAMOTIDINE 40 MG/1
40 TABLET, FILM COATED ORAL 2 TIMES DAILY
Qty: 60 TABLET | Refills: 1 | Status: SHIPPED | OUTPATIENT
Start: 2022-01-14 | End: 2022-09-23

## 2022-01-14 RX ORDER — CETIRIZINE HYDROCHLORIDE 10 MG/1
10 TABLET ORAL DAILY
COMMUNITY

## 2022-01-14 RX ORDER — VALACYCLOVIR HYDROCHLORIDE 500 MG/1
500 TABLET, FILM COATED ORAL DAILY
COMMUNITY
End: 2023-03-13

## 2022-01-14 ASSESSMENT — MIFFLIN-ST. JEOR: SCORE: 1711.7

## 2022-01-14 NOTE — PROGRESS NOTES
SUBJECTIVE:   CC: Alireza Jung is an 36 year old male who presents for preventative health visit.       Patient has been advised of split billing requirements and indicates understanding: Yes  Healthy Habits:     Getting at least 3 servings of Calcium per day:  Yes    Bi-annual eye exam:  NO    Dental care twice a year:  Yes    Sleep apnea or symptoms of sleep apnea:  Daytime drowsiness    Diet:  Regular (no restrictions)    Frequency of exercise:  None    Taking medications regularly:  Yes    Medication side effects:  None    PHQ-2 Total Score: 0    Additional concerns today:  Yes          PROBLEMS TO ADD ON...  Patient has had problems with recurrently urticaria and wheezing for the last 3 weeks.  Today's PHQ-2 Score:   PHQ-2 ( 1999 Pfizer) 1/13/2022   Q1: Little interest or pleasure in doing things 0   Q2: Feeling down, depressed or hopeless 0   PHQ-2 Score 0   Q1: Little interest or pleasure in doing things Not at all   Q2: Feeling down, depressed or hopeless Not at all   PHQ-2 Score 0       Abuse: Current or Past(Physical, Sexual or Emotional)- No  Do you feel safe in your environment? Yes    Have you ever done Advance Care Planning? (For example, a Health Directive, POLST, or a discussion with a medical provider or your loved ones about your wishes): No, advance care planning information given to patient to review.  Advanced care planning was discussed at today's visit.    Social History     Tobacco Use     Smoking status: Never Smoker     Smokeless tobacco: Never Used   Substance Use Topics     Alcohol use: Yes     Alcohol/week: 12.0 standard drinks     If you drink alcohol do you typically have >3 drinks per day or >7 drinks per week? Yes      Alcohol Use 1/14/2022   Prescreen: >3 drinks/day or >7 drinks/week? -   Prescreen: >3 drinks/day or >7 drinks/week?    AUDIT SCORE  -     AUDIT - Alcohol Use Disorders Identification Test - Reproduced from the World Health Organization Audit 2001 (Second Edition)  1/13/2022   1.  How often do you have a drink containing alcohol? 2 to 3 times a week   2.  How many drinks containing alcohol do you have on a typical day when you are drinking? 3 or 4   3.  How often do you have five or more drinks on one occasion? Monthly   4.  How often during the last year have you found that you were not able to stop drinking once you had started? Monthly   5.  How often during the last year have you failed to do what was normally expected of you because of drinking? Never   6.  How often during the last year have you needed a first drink in the morning to get yourself going after a heavy drinking session? Never   7.  How often during the last year have you had a feeling of guilt or remorse after drinking? Less than monthly   8.  How often during the last year have you been unable to remember what happened the night before because of your drinking? Never   9.  Have you or someone else been injured because of your drinking? No   10. Has a relative, friend, doctor or other health care worker been concerned about your drinking or suggested you cut down? No   TOTAL SCORE 9       Last PSA: No results found for: PSA    Reviewed orders with patient. Reviewed health maintenance and updated orders accordingly - Yes  Labs reviewed in EPIC  BP Readings from Last 3 Encounters:   01/14/22 124/88   12/22/21 123/89   11/18/19 110/66    Wt Readings from Last 3 Encounters:   01/14/22 82.1 kg (181 lb)   12/22/21 83 kg (183 lb)   06/02/20 75.8 kg (167 lb)                  Patient Active Problem List   Diagnosis     Herpes Simplex     Male Infertility     Arthropathy Of Multiple Sites     Anxiety     No past surgical history on file.    Social History     Tobacco Use     Smoking status: Never Smoker     Smokeless tobacco: Never Used   Substance Use Topics     Alcohol use: Yes     Alcohol/week: 12.0 standard drinks     No family history on file.      Current Outpatient Medications   Medication Sig Dispense Refill      acetaminophen (TYLENOL) 500 MG tablet Take 500-1,000 mg by mouth every 6 hours as needed for mild pain       albuterol (PROAIR HFA/PROVENTIL HFA/VENTOLIN HFA) 108 (90 Base) MCG/ACT inhaler Inhale 2 puffs into the lungs every 4 hours as needed for shortness of breath / dyspnea or wheezing 18 g 1     cetirizine (ZYRTEC) 10 MG tablet Take 10 mg by mouth daily       famotidine (PEPCID) 40 MG tablet Take 1 tablet (40 mg) by mouth 2 times daily 60 tablet 1     hydrOXYzine (ATARAX) 25 MG tablet Take 1 tablet (25 mg) by mouth every 6 hours as needed for itching 40 tablet 0     ibuprofen (ADVIL/MOTRIN) 100 MG tablet Take 100 mg by mouth every 4 hours as needed       valACYclovir (VALTREX) 500 MG tablet Take 500 mg by mouth daily 1 daily for 5 days during outbreaks       Allergies   Allergen Reactions     Latex        Reviewed and updated as needed this visit by clinical staff  Tobacco  Allergies  Meds             Reviewed and updated as needed this visit by Provider               No past medical history on file.   No past surgical history on file.    Review of Systems  CONSTITUTIONAL: NEGATIVE for fever, chills, change in weight  INTEGUMENTARY/SKIN: Patient currently has an outbreak of HSV in the genital area and has had recurrently urticaria for the last 3 weeks  EYES: NEGATIVE for vision changes or irritation  ENT: Positive in-home COVID test 1/4/2022 with primarily cough sore throat and no significant fever or loss of taste or smell.  RESP:NEGATIVE for significant cough or SOB, cough-productive, dyspnea on exertion and wheezing  CV: NEGATIVE for chest pain, palpitations or peripheral edema  GI: NEGATIVE for nausea, abdominal pain, or change in bowel habits, has had heartburn without dysphagia currently taking omeprazole   male: negative for dysuria, hematuria, decreased urinary stream, erectile dysfunction, urethral discharge   male: HSV genital area recurrent using valacyclovir  MUSCULOSKELETAL: NEGATIVE for  "significant arthralgias or myalgia  NEURO: NEGATIVE for weakness, dizziness or paresthesias  PSYCHIATRIC: NEGATIVE for changes in mood or affect    OBJECTIVE:   /88 (BP Location: Right arm, Patient Position: Sitting, Cuff Size: Adult Regular)   Pulse 91   Ht 1.689 m (5' 6.5\")   Wt 82.1 kg (181 lb)   SpO2 100%   BMI 28.78 kg/m      Physical Exam  GENERAL: healthy, alert and no distress  EYES: Eyes grossly normal to inspection, PERRL and conjunctivae and sclerae normal  HENT: ear canals and TM's normal, nose and mouth without ulcers or lesions  NECK: no adenopathy, no asymmetry, masses, or scars and thyroid normal to palpation  RESP: lungs clear to auscultation - no rales, rhonchi or wheezes  CV: regular rate and rhythm, normal S1 S2, no S3 or S4, no murmur, click or rub, no peripheral edema and peripheral pulses strong  ABDOMEN: soft, nontender, no hepatosplenomegaly, no masses and bowel sounds normal  MS: no gross musculoskeletal defects noted, no edema  SKIN: no suspicious lesions or rashes and strongly positive dermatographism with your urticaria and areas of pressure and scratching  NEURO: Normal strength and tone, mentation intact and speech normal  PSYCH: mentation appears normal, affect normal/bright    Diagnostic Test Results:  Labs reviewed in Epic  Results for orders placed or performed in visit on 01/14/22   Comprehensive metabolic panel     Status: Normal   Result Value Ref Range    Sodium 139 136 - 145 mmol/L    Potassium 4.6 3.5 - 5.0 mmol/L    Chloride 103 98 - 107 mmol/L    Carbon Dioxide (CO2) 23 22 - 31 mmol/L    Anion Gap 13 5 - 18 mmol/L    Urea Nitrogen 11 8 - 22 mg/dL    Creatinine 1.06 0.70 - 1.30 mg/dL    Calcium 9.8 8.5 - 10.5 mg/dL    Glucose 90 70 - 125 mg/dL    Alkaline Phosphatase 97 45 - 120 U/L    AST 17 0 - 40 U/L    ALT 33 0 - 45 U/L    Protein Total 7.6 6.0 - 8.0 g/dL    Albumin 4.2 3.5 - 5.0 g/dL    Bilirubin Total 0.5 0.0 - 1.0 mg/dL    GFR Estimate >90 >60 mL/min/1.73m2 "   Lipid panel reflex to direct LDL Fasting     Status: Abnormal   Result Value Ref Range    Cholesterol 235 (H) <=199 mg/dL    Triglycerides 170 (H) <=149 mg/dL    Direct Measure HDL 46 >=40 mg/dL    LDL Cholesterol Calculated 155 (H) <=129 mg/dL    Patient Fasting > 8hrs? No    CBC with platelets and differential     Status: Abnormal   Result Value Ref Range    WBC Count 7.8 4.0 - 11.0 10e3/uL    RBC Count 6.11 (H) 4.40 - 5.90 10e6/uL    Hemoglobin 16.1 13.3 - 17.7 g/dL    Hematocrit 49.0 40.0 - 53.0 %    MCV 80 78 - 100 fL    MCH 26.4 (L) 26.5 - 33.0 pg    MCHC 32.9 31.5 - 36.5 g/dL    RDW 13.7 10.0 - 15.0 %    Platelet Count 307 150 - 450 10e3/uL    % Neutrophils 64 %    % Lymphocytes 27 %    % Monocytes 7 %    % Eosinophils 2 %    % Basophils 0 %    % Immature Granulocytes 0 %    Absolute Neutrophils 5.0 1.6 - 8.3 10e3/uL    Absolute Lymphocytes 2.1 0.8 - 5.3 10e3/uL    Absolute Monocytes 0.6 0.0 - 1.3 10e3/uL    Absolute Eosinophils 0.1 0.0 - 0.7 10e3/uL    Absolute Basophils 0.0 0.0 - 0.2 10e3/uL    Absolute Immature Granulocytes 0.0 <=0.4 10e3/uL   CBC with Platelets & Differential     Status: Abnormal    Narrative    The following orders were created for panel order CBC with Platelets & Differential.  Procedure                               Abnormality         Status                     ---------                               -----------         ------                     CBC with platelets and d...[[id number]]  Abnormal            Final result                 Please view results for these tests on the individual orders.       ASSESSMENT/PLAN:   Alireza was seen today for physical and covid concern.    Diagnoses and all orders for this visit:    Routine general medical examination at a health care facility  -     REVIEW OF HEALTH MAINTENANCE PROTOCOL ORDERS  Patient did not want to do any immunizations today such as the influenza vaccine    Urticaria  -     CBC with Platelets & Differential, hemoglobin 16.1  with normal white blood cell count and no elevation of the eosinophils    -     famotidine (PEPCID) 40 MG tablet; Take 1 tablet (40 mg) by mouth 2 times daily  Patient also will continue with Zyrtec 10 mg daily and hydroxyzine as needed  Try to avoid long hot showers  Try to avoid scratching  Use a good moisturizing cream such as CeraVe or Vanicream    Wheezing  The patient developed wheezing and he urticaria a couple weeks before he tested positive for COVID.  He had negative COVID tests when he was seen for his urticaria.  The patient is fully vaccinated for COVID-19 including the #3 shot with Moderna.  The patient's COVID-19 symptoms are mild and positive test was on 1/4/2022  The patient currently is using albuterol about twice a day.  No today I did not hear any wheezing and his O2 saturation was 100% on room air.  Triggers for the wheezing may be related to cold air exposure and exertion.  Patient has not had a past history of asthma.    HSV-2 (herpes simplex virus 2) infection  Patient will continue with valacyclovir    Gastroesophageal reflux disease with esophagitis without hemorrhage  -     famotidine (PEPCID) 40 MG tablet; Take 1 tablet (40 mg) by mouth 2 times daily  Because of the GERD symptoms as well as the urticaria I like for him to use famotidine 40 mg twice a day and if symptoms controlled after 2 to 4 weeks in regard to his urticaria and also his reflux symptoms decrease the famotidine to 40 mg at bedtime and continue that for at least 3 months    Lipid screening  -     Lipid panel reflex to direct LDL Fasting, moderate elevation at 235/170/46/155    Screening for diabetes mellitus  -     Comprehensive metabolic panel, blood glucose 90 with no evidence for diabetes and GFR greater than 90 with normal electrolytes and liver function    Encounter for hepatitis C screening test for low risk patient  -     Hepatitis C Screen Reflex to HCV RNA Quant and Genotype, pending        Patient has been  "advised of split billing requirements and indicates understanding: Yes  COUNSELING:   Reviewed preventive health counseling, as reflected in patient instructions       Regular exercise       Healthy diet/nutrition       Consider Hep C screening for all patients one time for ages 18-79 years    Estimated body mass index is 28.78 kg/m  as calculated from the following:    Height as of this encounter: 1.689 m (5' 6.5\").    Weight as of this encounter: 82.1 kg (181 lb).     Weight management plan: Discussed healthy diet and exercise guidelines    He reports that he has never smoked. He has never used smokeless tobacco.      Test results by Yvette  Follow-up 2 weeks if not improved.  Syndevrx message and then referral to allergy    Bobby Lord MD  [facility]  "

## 2022-01-16 NOTE — RESULT ENCOUNTER NOTE
Alireza,    It was nice to have the opportunity to see you in the clinic this past week.  Your tests are back except for the screening for hepatitis C which is still pending.  I will send you another message when that result is available.    Your lipids are moderately elevated with a total cholesterol of 235, the LDL cholesterol is the bad cholesterol and is 155 and the HDL is the good cholesterol and is 46.  Would like to see the LDL below 130.  Your triglycerides are mildly elevated at 170 and should be less than 150.  Triglycerides go down significantly with modest weight loss such as 10 pounds and also limiting sweets including sweet drinks such as alcohol.  High carbohydrate diet increase the triglycerides as well.  Whole grains are okay.  Food that increases cholesterol are primarily the saturated fats including red meat.  Occasional lean red meat is okay and if you like fish salmon is a good choice.  Nuts such as almonds and walnuts also have good fats and good source of protein.    The metabolic panel showed that you do not have any evidence for diabetes with normal blood glucose and kidney function which is the GFR is normal.  Also liver function and electrolytes are normal.    The CBC shows that you are not anemic with a hemoglobin of 16.1.  I included a white blood cell count which is normal and specifically wanted to check the eosinophils which sometimes are elevated and people that have an allergy problem causing hives and also the wheezing.  Your eosinophil numbers look normal.    Keep me informed on how you are doing with your rash, breathing and heartburn symptoms.    Please let me know if you have any other questions at the time or if I can help in any other way.    Best regards,Dr. Lord

## 2022-01-17 LAB — HCV AB SERPL QL IA: NONREACTIVE

## 2022-02-15 ENCOUNTER — MYC MEDICAL ADVICE (OUTPATIENT)
Dept: FAMILY MEDICINE | Facility: CLINIC | Age: 37
End: 2022-02-15
Payer: COMMERCIAL

## 2022-02-16 DIAGNOSIS — F41.1 GAD (GENERALIZED ANXIETY DISORDER): Primary | ICD-10-CM

## 2022-02-21 ENCOUNTER — NURSE TRIAGE (OUTPATIENT)
Dept: NURSING | Facility: CLINIC | Age: 37
End: 2022-02-21
Payer: COMMERCIAL

## 2022-02-21 DIAGNOSIS — F41.1 GAD (GENERALIZED ANXIETY DISORDER): Primary | ICD-10-CM

## 2022-02-21 DIAGNOSIS — L50.9 URTICARIA: ICD-10-CM

## 2022-02-21 RX ORDER — HYDROXYZINE HYDROCHLORIDE 25 MG/1
25 TABLET, FILM COATED ORAL EVERY 6 HOURS PRN
Qty: 40 TABLET | Refills: 0 | Status: SHIPPED | OUTPATIENT
Start: 2022-02-21 | End: 2023-11-28

## 2022-02-21 NOTE — TELEPHONE ENCOUNTER
Patient is calling about anxiety medication that he just started and is causing fatigue and not able to concentration.  Medication is Sertraline.  Medication was just started.  Patient feels very foggy and is still taking only 1/2 tablet and tomorrow is suppose to take a whole tablet.  Patient feels that he cannot function well and has to work and take care of three children.  Please phone patient within two hours.    COVID 19 Nurse Triage Plan/Patient Instructions    Please be aware that novel coronavirus (COVID-19) may be circulating in the community. If you develop symptoms such as fever, cough, or SOB or if you have concerns about the presence of another infection including coronavirus (COVID-19), please contact your health care provider or visit [email or web address].     Disposition/Instructions    Home care recommended. Follow home care protocol based instructions.    Thank you for taking steps to prevent the spread of this virus.  o Limit your contact with others.  o Wear a simple mask to cover your cough.  o Wash your hands well and often.    Resources    [facility]: About COVID-19: [email or web address]    CDC: What to Do If You're Sick: www.cdc.gov/coronavirus/2019-ncov/about/steps-when-sick.html    CDC: Ending Home Isolation: www.cdc.gov/coronavirus/2019-ncov/hcp/disposition-in-home-patients.html     CDC: Caring for Someone: www.cdc.gov/coronavirus/2019-ncov/if-you-are-sick/care-for-someone.html     [facility]: Interim Guidance for Hospital Discharge to Home: www.health.[facility].[place].us/diseases/coronavirus/hcp/hospdischarge.pdf    [facility] clinical trials (COVID-19 research studies): [email or web address]/umn-clinical-trials     Below are the COVID-19 hotlines at the [facility] ([facility]). Interpreters are available.   o For health questions: Call [phone] or 1-393.455.1866 (7 a.m. to 7 p.m.)  o For questions about schools and childcare: Call [phone]  or [phone] (7 a.m. to 7 p.m.)

## 2022-02-21 NOTE — PROGRESS NOTES
Called and talked to patient about his use of sertraline that was started about 4- 5 days ago for anxiety.  The patient even just taking 25 mg daily is having brain fog, trouble concentration and being sedated.  He has not taken his medicine today.    I recommend that he stop the sertraline.    I suggested behavioral therapy and placed a referral to [facility] mental health services for behavioral therapy.    If appointment is out for weeks to months we will need to find an alternative solution.    Patient does have some hydroxyzine which he has been using for itching and also for sleep and okay to use that for some anxiety symptoms as well.    He recalls a number of years ago being on a medication which he does not recall the name of for anxiety and had some difficulty taking that as well.    Bobby Lord MD

## 2022-02-21 NOTE — TELEPHONE ENCOUNTER
Medication: Hydroxyzine 25mg  Last Date Filled: 12/22/2021  Last appointment addressing medication: 1/14/22  Last B/P:  BP Readings from Last 3 Encounters:   01/14/22 124/88   12/22/21 123/89   11/18/19 110/66     Last labs pertaining to refill:

## 2022-04-15 ENCOUNTER — VIRTUAL VISIT (OUTPATIENT)
Dept: FAMILY MEDICINE | Facility: CLINIC | Age: 37
End: 2022-04-15
Payer: COMMERCIAL

## 2022-04-15 DIAGNOSIS — F43.21 SITUATIONAL DEPRESSION: Primary | ICD-10-CM

## 2022-04-15 PROCEDURE — 99214 OFFICE O/P EST MOD 30 MIN: CPT | Mod: GT | Performed by: INTERNAL MEDICINE

## 2022-04-15 PROCEDURE — 96127 BRIEF EMOTIONAL/BEHAV ASSMT: CPT | Mod: GT | Performed by: INTERNAL MEDICINE

## 2022-04-15 RX ORDER — FLUOXETINE 10 MG/1
TABLET, FILM COATED ORAL
Qty: 30 TABLET | Refills: 1 | Status: SHIPPED | OUTPATIENT
Start: 2022-04-15 | End: 2022-09-23

## 2022-04-15 ASSESSMENT — PATIENT HEALTH QUESTIONNAIRE - PHQ9
SUM OF ALL RESPONSES TO PHQ QUESTIONS 1-9: 13
SUM OF ALL RESPONSES TO PHQ QUESTIONS 1-9: 13
10. IF YOU CHECKED OFF ANY PROBLEMS, HOW DIFFICULT HAVE THESE PROBLEMS MADE IT FOR YOU TO DO YOUR WORK, TAKE CARE OF THINGS AT HOME, OR GET ALONG WITH OTHER PEOPLE: VERY DIFFICULT

## 2022-04-15 NOTE — PROGRESS NOTES
Alireza is a 37 year old who is being evaluated via a billable video visit.      How would you like to obtain your AVS? MyChart  If the video visit is dropped, the invitation should be resent by: Text to cell phone:    Will anyone else be joining your video visit? No      Video Start Time: 4:27 PM    Assessment & Plan     Situational depression  Suggested fluoxetine due to its activiating properties and his feelings of low motivation, sleeping too much, apathy, etc.  Given his pretty dramatic response to a low dose of sertraline and his concern for side effects, will start with a very low dose.   - FLUoxetine (PROZAC) 10 MG tablet; Take 5mg (1/2 tab) once daily      Depression Screening Follow Up    PHQ 4/15/2022   PHQ-9 Total Score 13   Q9: Thoughts of better off dead/self-harm past 2 weeks Not at all         Return in about 6 weeks (around 5/27/2022) for Mental health.    Xiomara De Dios MD  [facility]    Subjective   Alireza is a 37 year old who presents for the following health issues       Alireza has been struggling with some depression symptoms.  He has a long history of anxiety, but hasn't felt down like this for such a period of time.  He is going through custody hearings of his kids after a 10 year relationship.  Also just went through a break up from a new relationship.  He doesn't feel particularly anxietious right now, but describes feeling empty, worthless, not getting enjoyment out of things.  States he can find any number of reasons his life is good but can't get out of this rut.  Falls asleep okay, hard to get out of bed in the morning. He has started seeing a counselor a few weeks ago, that is a good relationship.  Also has good support from his family and friends.  No thoughts of self harm.      He took sertraline recently, 25-50mg, and was very drowsy and foggy.  Notes he works as a  with a team under him and cannot function the way he felt on that medication.   Would like to try something else.       History of Present Illness       Mental Health Follow-up:  Patient presents to follow-up on Depression.Patient's depression since last visit has been:  Worse  The patient is not having other symptoms associated with depression.      Any significant life events: relationship concerns and grief or loss  Patient is not feeling anxious or having panic attacks.  Patient has concerns about alcohol or drug use.       Today's PHQ-9         PHQ-9 Total Score: 13  PHQ-9 Q9 Thoughts of better off dead/self-harm past 2 weeks :   (P) Not at all    How difficult have these problems made it for you to do your work, take care of things at home, or get along with other people: Very difficult        He eats 0-1 servings of fruits and vegetables daily.He consumes 2 sweetened beverage(s) daily.He exercises with enough effort to increase his heart rate 20 to 29 minutes per day.  He exercises with enough effort to increase his heart rate 3 or less days per week.   He is taking medications regularly.         Review of Systems   Psych reviewed,  otherwise negative unless noted above.        Objective           Vitals:  No vitals were obtained today due to virtual visit.    Physical Exam   GENERAL: Healthy, alert and no distress  EYES: Eyes grossly normal to inspection.  No discharge or erythema, or obvious scleral/conjunctival abnormalities.  RESP: No audible wheeze, cough, or visible cyanosis.  No visible retractions or increased work of breathing.    SKIN: Visible skin clear. No significant rash, abnormal pigmentation or lesions.  NEURO: Cranial nerves grossly intact.  Mentation and speech appropriate for age.  PSYCH: Mentation appears normal, affect normal/bright, judgement and insight intact, normal speech and appearance well-groomed.            Video-Visit Details    Type of service:  Video Visit    Video End Time:4:37 PM    Originating Location (pt. Location): Home    Distant Location  (provider location):  [facility]     Platform used for Video Visit: [employer]

## 2022-05-10 PROCEDURE — U0003 INFECTIOUS AGENT DETECTION BY NUCLEIC ACID (DNA OR RNA); SEVERE ACUTE RESPIRATORY SYNDROME CORONAVIRUS 2 (SARS-COV-2) (CORONAVIRUS DISEASE [COVID-19]), AMPLIFIED PROBE TECHNIQUE, MAKING USE OF HIGH THROUGHPUT TECHNOLOGIES AS DESCRIBED BY CMS-2020-01-R: HCPCS | Performed by: INTERNAL MEDICINE

## 2022-05-11 ENCOUNTER — LAB REQUISITION (OUTPATIENT)
Dept: LAB | Facility: CLINIC | Age: 37
End: 2022-05-11

## 2022-05-11 LAB — SARS-COV-2 RNA RESP QL NAA+PROBE: NEGATIVE

## 2022-06-04 ENCOUNTER — MYC MEDICAL ADVICE (OUTPATIENT)
Dept: FAMILY MEDICINE | Facility: CLINIC | Age: 37
End: 2022-06-04
Payer: COMMERCIAL

## 2022-06-04 DIAGNOSIS — F41.1 ANXIETY STATE: Primary | ICD-10-CM

## 2022-06-06 RX ORDER — FLUOXETINE 10 MG/1
10 CAPSULE ORAL DAILY
Qty: 30 CAPSULE | Refills: 1 | Status: SHIPPED | OUTPATIENT
Start: 2022-06-06 | End: 2022-08-26

## 2022-06-24 DIAGNOSIS — K21.00 GASTROESOPHAGEAL REFLUX DISEASE WITH ESOPHAGITIS WITHOUT HEMORRHAGE: ICD-10-CM

## 2022-06-25 NOTE — TELEPHONE ENCOUNTER
"Last Written Prescription Date:  4/20/22  Last Fill Quantity: 45,  # refills: 0   Last office visit provider:  1/14/22     Requested Prescriptions   Pending Prescriptions Disp Refills     omeprazole (PRILOSEC) 20 MG DR capsule [Pharmacy Med Name: OMEPRAZOLE 20MG CAPSULES] 45 capsule 0     Sig: TAKE 1 CAPSULE(20 MG) BY MOUTH DAILY AS NEEDED FOR REFLUX OR SYMPTOMS       PPI Protocol Passed - 6/24/2022  4:25 PM        Passed - Not on Clopidogrel (unless Pantoprazole ordered)        Passed - No diagnosis of osteoporosis on record        Passed - Recent (12 mo) or future (30 days) visit within the authorizing provider's specialty     Patient has had an office visit with the authorizing provider or a provider within the authorizing providers department within the previous 12 mos or has a future within next 30 days. See \"Patient Info\" tab in inbasket, or \"Choose Columns\" in Meds & Orders section of the refill encounter.              Passed - Medication is active on med list        Passed - Patient is age 18 or older             Benita Beatty RN 06/25/22 7:18 AM  "

## 2022-08-24 DIAGNOSIS — F41.1 ANXIETY STATE: ICD-10-CM

## 2022-08-26 ENCOUNTER — TELEPHONE (OUTPATIENT)
Dept: NURSING | Facility: CLINIC | Age: 37
End: 2022-08-26

## 2022-08-26 ENCOUNTER — TELEPHONE (OUTPATIENT)
Dept: FAMILY MEDICINE | Facility: CLINIC | Age: 37
End: 2022-08-26

## 2022-08-26 DIAGNOSIS — F43.21 SITUATIONAL DEPRESSION: ICD-10-CM

## 2022-08-26 RX ORDER — FLUOXETINE 10 MG/1
CAPSULE ORAL
Qty: 30 CAPSULE | Refills: 1 | Status: SHIPPED | OUTPATIENT
Start: 2022-08-26 | End: 2022-09-23

## 2022-08-26 NOTE — TELEPHONE ENCOUNTER
"Former patient of Risa & has not established care with another provider.  Please assign refill request to covering provider per clinic standard process.    Routing refill request to provider for review/approval because:  No PCP    Last Written Prescription Date:  6/6/22  Last Fill Quantity: 30,  # refills: 1   Last office visit provider:  1/14/22     Requested Prescriptions   Pending Prescriptions Disp Refills     FLUoxetine (PROZAC) 10 MG capsule [Pharmacy Med Name: FLUOXETINE 10MG CAPSULES] 30 capsule 1     Sig: TAKE 1 CAPSULE(10 MG) BY MOUTH DAILY       SSRIs Protocol Passed - 8/26/2022  7:48 AM        Passed - Recent (12 mo) or future (30 days) visit within the authorizing provider's specialty     Patient has had an office visit with the authorizing provider or a provider within the authorizing providers department within the previous 12 mos or has a future within next 30 days. See \"Patient Info\" tab in inbasket, or \"Choose Columns\" in Meds & Orders section of the refill encounter.              Passed - Medication is active on med list        Passed - Patient is age 18 or older             Micah Hansen RN 08/26/22 7:48 AM  "

## 2022-08-26 NOTE — TELEPHONE ENCOUNTER
Error patients refill has already been requested and is in progress    Roshni Matamoros RN   [facility] Nurse Advisor  9:05 AM 8/26/2022

## 2022-08-26 NOTE — TELEPHONE ENCOUNTER
Telephone call  Patient calling to request a refill on his Prozac.  He has enough through august 30th.  It appears it is in progress and the patient has a appointment to be established with a new doctor on 9/23/2022 with dr England.    Roshni Matamoros RN   [facility] Nurse Advisor  9:09 AM 8/26/2022    COVID 19 Nurse Triage Plan/Patient Instructions    Please be aware that novel coronavirus (COVID-19) may be circulating in the community. If you develop symptoms such as fever, cough, or SOB or if you have concerns about the presence of another infection including coronavirus (COVID-19), please contact your health care provider or visit [email or web address].     Disposition/Instructions    Home care recommended. Follow home care protocol based instructions.    Thank you for taking steps to prevent the spread of this virus.  o Limit your contact with others.  o Wear a simple mask to cover your cough.  o Wash your hands well and often.    Resources    [facility]: About COVID-19: [email or web address]    CDC: What to Do If You're Sick: www.cdc.gov/coronavirus/2019-ncov/about/steps-when-sick.html    CDC: Ending Home Isolation: www.cdc.gov/coronavirus/2019-ncov/hcp/disposition-in-home-patients.html     CDC: Caring for Someone: www.cdc.gov/coronavirus/2019-ncov/if-you-are-sick/care-for-someone.html     [facility]: Interim Guidance for Hospital Discharge to Home: www.health.[facility]/diseases/coronavirus/hcp/hospdischarge.pdf    [facility] clinical trials (COVID-19 research studies): [email or web address]-clinical-trials     Below are the COVID-19 hotlines at the [facility] of Health ([facility]). Interpreters are available.   o For health questions: Call 176-065-4525 or 1-880.223.7047 (7 a.m. to 7 p.m.)  o For questions about schools and childcare: Call 394-200-1637 or 1-369.505.3848 (7 a.m. to 7 p.m.)

## 2022-09-23 ENCOUNTER — ANCILLARY PROCEDURE (OUTPATIENT)
Dept: GENERAL RADIOLOGY | Facility: CLINIC | Age: 37
End: 2022-09-23
Attending: PHYSICIAN ASSISTANT
Payer: COMMERCIAL

## 2022-09-23 ENCOUNTER — OFFICE VISIT (OUTPATIENT)
Dept: FAMILY MEDICINE | Facility: CLINIC | Age: 37
End: 2022-09-23
Payer: COMMERCIAL

## 2022-09-23 VITALS
HEIGHT: 68 IN | WEIGHT: 188 LBS | TEMPERATURE: 98.7 F | BODY MASS INDEX: 28.49 KG/M2 | HEART RATE: 84 BPM | OXYGEN SATURATION: 98 % | DIASTOLIC BLOOD PRESSURE: 70 MMHG | RESPIRATION RATE: 16 BRPM | SYSTOLIC BLOOD PRESSURE: 110 MMHG

## 2022-09-23 DIAGNOSIS — G89.29 CHRONIC PAIN OF RIGHT KNEE: Primary | ICD-10-CM

## 2022-09-23 DIAGNOSIS — M25.561 CHRONIC PAIN OF RIGHT KNEE: Primary | ICD-10-CM

## 2022-09-23 DIAGNOSIS — M25.561 CHRONIC PAIN OF RIGHT KNEE: ICD-10-CM

## 2022-09-23 DIAGNOSIS — K21.00 GASTROESOPHAGEAL REFLUX DISEASE WITH ESOPHAGITIS WITHOUT HEMORRHAGE: ICD-10-CM

## 2022-09-23 DIAGNOSIS — B00.9 HERPES SIMPLEX VIRUS (HSV) INFECTION: ICD-10-CM

## 2022-09-23 DIAGNOSIS — G89.29 CHRONIC PAIN OF RIGHT KNEE: ICD-10-CM

## 2022-09-23 DIAGNOSIS — L50.9 URTICARIA: ICD-10-CM

## 2022-09-23 DIAGNOSIS — L50.9 HIVES: Primary | ICD-10-CM

## 2022-09-23 DIAGNOSIS — F41.1 ANXIETY STATE: ICD-10-CM

## 2022-09-23 DIAGNOSIS — G89.29 CHRONIC PAIN OF LEFT KNEE: ICD-10-CM

## 2022-09-23 DIAGNOSIS — M25.562 CHRONIC PAIN OF LEFT KNEE: ICD-10-CM

## 2022-09-23 DIAGNOSIS — E78.5 HYPERLIPIDEMIA LDL GOAL <100: ICD-10-CM

## 2022-09-23 PROCEDURE — 73560 X-RAY EXAM OF KNEE 1 OR 2: CPT | Mod: TC | Performed by: RADIOLOGY

## 2022-09-23 PROCEDURE — 99214 OFFICE O/P EST MOD 30 MIN: CPT | Performed by: PHYSICIAN ASSISTANT

## 2022-09-23 RX ORDER — FLUOXETINE 10 MG/1
CAPSULE ORAL
Qty: 90 CAPSULE | Refills: 1 | Status: SHIPPED | OUTPATIENT
Start: 2022-09-23 | End: 2023-02-24

## 2022-09-23 ASSESSMENT — ANXIETY QUESTIONNAIRES
GAD7 TOTAL SCORE: 0
3. WORRYING TOO MUCH ABOUT DIFFERENT THINGS: NOT AT ALL
GAD7 TOTAL SCORE: 0
4. TROUBLE RELAXING: NOT AT ALL
5. BEING SO RESTLESS THAT IT IS HARD TO SIT STILL: NOT AT ALL
GAD7 TOTAL SCORE: 0
1. FEELING NERVOUS, ANXIOUS, OR ON EDGE: NOT AT ALL
8. IF YOU CHECKED OFF ANY PROBLEMS, HOW DIFFICULT HAVE THESE MADE IT FOR YOU TO DO YOUR WORK, TAKE CARE OF THINGS AT HOME, OR GET ALONG WITH OTHER PEOPLE?: NOT DIFFICULT AT ALL
2. NOT BEING ABLE TO STOP OR CONTROL WORRYING: NOT AT ALL
6. BECOMING EASILY ANNOYED OR IRRITABLE: NOT AT ALL
7. FEELING AFRAID AS IF SOMETHING AWFUL MIGHT HAPPEN: NOT AT ALL
7. FEELING AFRAID AS IF SOMETHING AWFUL MIGHT HAPPEN: NOT AT ALL
IF YOU CHECKED OFF ANY PROBLEMS ON THIS QUESTIONNAIRE, HOW DIFFICULT HAVE THESE PROBLEMS MADE IT FOR YOU TO DO YOUR WORK, TAKE CARE OF THINGS AT HOME, OR GET ALONG WITH OTHER PEOPLE: NOT DIFFICULT AT ALL

## 2022-09-23 ASSESSMENT — ENCOUNTER SYMPTOMS
PALPITATIONS: 0
FATIGUE: 0
COUGH: 0
WHEEZING: 0
CHILLS: 0
SHORTNESS OF BREATH: 0
ACTIVITY CHANGE: 0
GASTROINTESTINAL NEGATIVE: 1
LIGHT-HEADEDNESS: 0
EYES NEGATIVE: 1
HEADACHES: 0
DIZZINESS: 0

## 2022-09-23 ASSESSMENT — PATIENT HEALTH QUESTIONNAIRE - PHQ9
SUM OF ALL RESPONSES TO PHQ QUESTIONS 1-9: 7
10. IF YOU CHECKED OFF ANY PROBLEMS, HOW DIFFICULT HAVE THESE PROBLEMS MADE IT FOR YOU TO DO YOUR WORK, TAKE CARE OF THINGS AT HOME, OR GET ALONG WITH OTHER PEOPLE: SOMEWHAT DIFFICULT
SUM OF ALL RESPONSES TO PHQ QUESTIONS 1-9: 7

## 2022-09-23 ASSESSMENT — PAIN SCALES - GENERAL: PAINLEVEL: NO PAIN (0)

## 2022-09-23 NOTE — PROGRESS NOTES
Assessment & Plan     ICD-10-CM    1. Hives  L50.9 Adult Allergy/Asthma Referral   2. Chronic pain of left knee  M25.562     G89.29    3. Chronic pain of right knee  M25.561 CANCELED: XR Knee Bilateral 3 Views    G89.29    4. Anxiety  F41.1 FLUoxetine (PROZAC) 10 MG capsule   5. Herpes Simplex  B00.9    6. Gastroesophageal reflux disease with esophagitis without hemorrhage  K21.00    7. Urticaria  L50.9    8. Hyperlipidemia LDL goal <100  E78.5      #1 hyperlipidemia-last LDL 1/2022 was mildly elevated at 155.  Diet and exercise discussed today.  We will plan to recheck this during his annual physical early next year    #2 anxiety/depression-currently on Prozac.  He is not using the hydroxyzine very frequently.  He feels that his mood is stable on his current medications and does not feel that there needs to be a dose adjustment today.    #3 GERD-currently well controlled on omeprazole    #4 herpes simplex-uses Valtrex as needed.    #5 chronic bilateral knee pain-this has worsened since falling about 4 weeks ago while playing volleyball.  Physical exam does not show any abnormalities today.  No redness, swelling to the knees bilaterally.  No instability with on exam today.  He does not have any palpable tenderness.  The pain is worse from going from a sitting to a standing position.  I do suspect patellofemoral syndrome.  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  We will start with getting x-rays of his knees bilaterally.  If x-rays are normal we could have him see orthopedics for further evaluation.    #6 hives-he is currently on Zyrtec which has been keeping his rash under control.  Initially started December 2021 after being diet noticed with COVID-19.  If he stops his Zyrtec the rash returns and he states that there raised bumps on his forearms.  He has tried prednisone in the past which helps while he is on it but  "once he stops the prednisone the rash returns.  We will have him see allergist for further evaluation    He is to follow-up in January 2023 for annual physical.  Sooner if needed.    BMI:   Estimated body mass index is 28.8 kg/m  as calculated from the following:    Height as of this encounter: 1.721 m (5' 7.75\").    Weight as of this encounter: 85.3 kg (188 lb).   Weight management plan: Discussed healthy diet and exercise guidelines        No follow-ups on file.    [facility]   Alireza is a 37 year old, presenting for the following health issues:  Establish Care, Recheck Medication (Regular follow up), Knee Pain (Hx of club foot as a child causing life long intermittent pain in both knee. Fell 4 wks ago playing volley ball pain has been worse since), and Derm Problem (Intermittent hives x 10 mo)      Alireza is a pleasant 37-year-old male that presents to the clinic today to establish care, discussed bilateral knee pain, and rash since COVID.  He has a past medical history of of herpes simplex, anxiety, GERD, and hyperlipidemia.    He is currently and Prozac and uses hydroxyzine as needed for underlying anxiety and depression.  He has not used the hydroxyzine for some time.  He feels his mood is stable    He did have labs 1/2022 elevated LDL at 155 at that time.  He is working on diet and exercise    He does have a history of herpes simplex and uses Valtrex as needed    Currently on omeprazole for GERD and symptoms are well controlled on his current dose.    Been having bilateral knee pain.  He states that his bilateral knee pain is chronic.  He does have a history of clubfeet bilaterally and was wondering if this is contributing to his pain.  4 weeks ago his bilateral knee pain worsened after he fell playing volleyball.  He has not noticed any popping, clicking, or instability of his knee.  He has not noticed any swelling or redness to his knee.  The " pain is just below the patella and is worse with from going to a standing and sitting position.  Sitting his pain is not worsened.  He is tried Tylenol and ibuprofen which does not seem to help.    He is also had a rash since December 2021.  He states it started after being diagnosed with COVID-19.  He has been taking Zyrtec since.  While he is on the Zyrtec the rash does not improve but once he stops the rash returns.  He states that they are hive looking and they are on his forearms bilaterally.  No other new medications, foods, or laundry detergents or lotions.  No airway issues.  He does not have the rash today.    Knee Pain  Associated symptoms include a rash. Pertinent negatives include no chest pain, chills, coughing, fatigue or headaches.   History of Present Illness       Reason for visit:  Med check up and knee pain concerns    He eats 2-3 servings of fruits and vegetables daily.He consumes 3 sweetened beverage(s) daily.He exercises with enough effort to increase his heart rate 10 to 19 minutes per day.  He exercises with enough effort to increase his heart rate 3 or less days per week.   He is taking medications regularly.    Today's PHQ-9         PHQ-9 Total Score: 7    PHQ-9 Q9 Thoughts of better off dead/self-harm past 2 weeks :   Not at all    How difficult have these problems made it for you to do your work, take care of things at home, or get along with other people: Somewhat difficult  Today's QUINTEN-7 Score: 0         Review of Systems   Constitutional: Negative for activity change, chills and fatigue.   HENT: Negative.    Eyes: Negative.    Respiratory: Negative for cough, shortness of breath and wheezing.    Cardiovascular: Negative for chest pain and palpitations.   Gastrointestinal: Negative.    Musculoskeletal:        Chronic bilateral knee pain.  Worsened after falling during volleyball 4 weeks ago.   Skin: Positive for rash.        Bilateral arms since December 2021 after being diagnosed with  "COVID-19.  Comes and goes.  Better with Zyrtec.   Neurological: Negative for dizziness, light-headedness and headaches.            Objective    /70 (BP Location: Left arm, Patient Position: Sitting, Cuff Size: Adult Regular)   Pulse 84   Temp 98.7  F (37.1  C) (Oral)   Resp 16   Ht 1.721 m (5' 7.75\")   Wt 85.3 kg (188 lb)   SpO2 98%   BMI 28.80 kg/m    Body mass index is 28.8 kg/m .  Physical Exam  Vitals and nursing note reviewed.   Constitutional:       General: He is not in acute distress.     Appearance: Normal appearance. He is not ill-appearing, toxic-appearing or diaphoretic.   HENT:      Head: Normocephalic and atraumatic.      Right Ear: Tympanic membrane, ear canal and external ear normal.      Left Ear: Tympanic membrane, ear canal and external ear normal.      Mouth/Throat:      Mouth: Mucous membranes are moist.      Pharynx: No oropharyngeal exudate or posterior oropharyngeal erythema.   Eyes:      Conjunctiva/sclera: Conjunctivae normal.   Cardiovascular:      Rate and Rhythm: Normal rate and regular rhythm.      Heart sounds: S1 normal and S2 normal.     No friction rub. No gallop.   Pulmonary:      Effort: Pulmonary effort is normal. No accessory muscle usage or prolonged expiration.      Breath sounds: Normal breath sounds and air entry.   Musculoskeletal:      Cervical back: Neck supple.      Right lower leg: No edema.      Left lower leg: No edema.      Comments: Bilateral knees: No redness, warmth or swelling to the knees bilaterally.  No pain with palpation.  Full range of motion that does not elicit pain to knees bilaterally.  Negative valgus test.  Negative valgus test.  Negative bounce home test.  No instability noted.   Skin:     General: Skin is warm and dry.      Findings: No lesion or rash.      Comments: No rashes noted on exam today.    Birthmark to his left anterior forearm that has not changed.     Neurological:      Mental Status: He is alert.                    "

## 2022-10-14 ENCOUNTER — OFFICE VISIT (OUTPATIENT)
Dept: ORTHOPEDICS | Facility: CLINIC | Age: 37
End: 2022-10-14
Attending: PHYSICIAN ASSISTANT
Payer: COMMERCIAL

## 2022-10-14 ENCOUNTER — ANCILLARY PROCEDURE (OUTPATIENT)
Dept: GENERAL RADIOLOGY | Facility: CLINIC | Age: 37
End: 2022-10-14
Attending: STUDENT IN AN ORGANIZED HEALTH CARE EDUCATION/TRAINING PROGRAM
Payer: COMMERCIAL

## 2022-10-14 VITALS
HEIGHT: 68 IN | WEIGHT: 188 LBS | DIASTOLIC BLOOD PRESSURE: 86 MMHG | BODY MASS INDEX: 28.49 KG/M2 | SYSTOLIC BLOOD PRESSURE: 132 MMHG

## 2022-10-14 DIAGNOSIS — M22.40 CHONDROMALACIA OF PATELLOFEMORAL JOINT, UNSPECIFIED LATERALITY: ICD-10-CM

## 2022-10-14 DIAGNOSIS — M25.561 CHRONIC PAIN OF BOTH KNEES: Primary | ICD-10-CM

## 2022-10-14 DIAGNOSIS — Q68.2 PATELLA ALTA: ICD-10-CM

## 2022-10-14 DIAGNOSIS — G89.29 CHRONIC PAIN OF BOTH KNEES: Primary | ICD-10-CM

## 2022-10-14 DIAGNOSIS — M76.50 PATELLAR TENDINOSIS: ICD-10-CM

## 2022-10-14 DIAGNOSIS — M25.562 CHRONIC PAIN OF BOTH KNEES: Primary | ICD-10-CM

## 2022-10-14 DIAGNOSIS — M22.2X9 PATELLOFEMORAL MALTRACKING: ICD-10-CM

## 2022-10-14 PROCEDURE — 99204 OFFICE O/P NEW MOD 45 MIN: CPT | Performed by: STUDENT IN AN ORGANIZED HEALTH CARE EDUCATION/TRAINING PROGRAM

## 2022-10-14 PROCEDURE — 73560 X-RAY EXAM OF KNEE 1 OR 2: CPT | Mod: TC | Performed by: RADIOLOGY

## 2022-10-14 NOTE — LETTER
10/14/2022         RE: Alireza Jung  [address] [place] [id number]        Dear Colleague,    Thank you for referring your patient, Alireza Jung, to the [facility] [place]. Please see a copy of my visit note below.    ASSESSMENT & PLAN    1. Chronic pain of both knees    2. Chondromalacia of patellofemoral joint, unspecified laterality    3. Patellofemoral maltracking    4. Patella renato    5. Patellar tendinosis      Alireza Jung is a 37 year old male presenting for evaluation of chronic bilateral knee pain which worsened after a jump during volleyball about 6 weeks ago.  History, exam and X-ray findings were reviewed today, consistent with patellofemoral maltracking and chondromalacia (cartilage irritation and wear under the kneecap) as well as patellar tendinosis in the setting of mild patella renato on X-ray, femoral anteversion and chronic foot/ankle weakness with history of clubfoot s/p revision surgeries as a child. We reviewed treatment options inclusive of pain control, activity modification, bracing and formal physical therapy. Also reviewed timing of advance imaging (ie MRI) and/or surgical referral.     At this time, will proceed with the following plan:  - Referral placed for formal physical therapy - exercises to include neuromuscular/proprioceptive control and VMO strengthening. Please also include pain-free closed chain/isometric/isotonic strengthening with use of modalities (including kinesioptaping) as needed/deemed appropriate with home exercise prescription.  - Continue the patellofemoral brace while playing volleyball.  - Topical diclofenac (Voltaren) gel is an over-the-counter NSAID gel that can be applied to the painful area(s) up to 4 times per day for up to 2 weeks.   - Ice after activity and as needed for pain or swelling.     Please schedule a follow up appointment to see me in 6-8 weeks, or sooner as needed for persistence or worsening of pain.  "You may call our direct clinic number ([phone]) at any time with questions or concerns.    Mare Laguerre MD, [facility]          -----    SUBJECTIVE  Alireza Jung is a/an 37 year old male who is seen in consultation at the request of  Gabino England PA-C for evaluation of bilateral knee pain. The patient is seen by themselves.    Onset: 2 month(s) ago. Patient describes injury as he was playing volleyball and jumped. Afterwards he noticed pain in his knees.  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  Social History     Socioeconomic History     Marital status: Single   Tobacco Use     Smoking status: Never     Smokeless tobacco: Never   Substance and Sexual Activity     Alcohol use: Yes     Alcohol/week: 12.0 standard drinks         Patient's past medical, surgical, social, and family histories were reviewed today and no changes are noted.    REVIEW OF SYSTEMS:  10 point ROS is negative other than symptoms noted above in HPI, Past Medical History or as stated below  Constitutional: NEGATIVE for fever, chills, change in weight  Skin: NEGATIVE for worrisome rashes, moles or lesions  GI/: NEGATIVE for bowel or bladder changes  Neuro: NEGATIVE for weakness, dizziness or paresthesias    OBJECTIVE:  /86   Ht 1.721 m (5' 7.75\")   Wt 85.3 kg (188 lb)   BMI 28.80 kg/m     General: healthy, alert and in no distress  HEENT: " no scleral icterus or conjunctival erythema  Skin: no suspicious lesions or rash. No jaundice.  CV: no pedal edema  Resp: normal respiratory effort without conversational dyspnea   Psych: normal mood and affect  Gait: normal steady gait with appropriate coordination and balance  Neuro: Normal light sensory exam of lower extremity  MSK:  BILATERAL KNEE  Inspection:    Genu valgum, femoral retroversion (L>R). Bilateral pes planus L>R  Palpation:    Tender about the proximal patellar tendon, lateral patellar facet. Remainder of bony and ligamentous landmarks are nontender.    Trace effusion is present    Patellofemoral crepitus is Present  Range of Motion:     00 extension to 1350 flexion - pain at end range  Strength:    Quadriceps 5-/5 and hamstrings 5-/5    Extensor mechanism intact    Dynamic knee valgus with single leg squat bilaterally  Special Tests:    Positive: Patellar grind, patellar inhibition    Negative: J sign, patellar apprehension, MCL/valgus stress (0 & 30 deg), LCL/varus stress (0 & 30 deg), Lachman's, anterior drawer, posterior drawer, Darrin's, Thessaly's    Independent visualization of the below image:  Recent Results (from the past 24 hour(s))   XR Knee Bilateral 1/2 vw    Narrative    KNEE ONE-TWO VIEWS BILATERAL  10/14/2022 1:50 PM     HISTORY: bilateral sunrise view; Chondromalacia of patellofemoral  joint, unspecified laterality  COMPARISON: None.      Impression    IMPRESSION: No acute fracture or malalignment. There is normal  patellofemoral compartment joint spacing bilaterally. No significant  prepatellar soft tissue swelling.    PINA PATIÑO MD         SYSTEM ID:  [id number]     Results for orders placed or performed in visit on 09/23/22   XR Knee Bilateral 1/2 Views    Narrative    EXAM: XR KNEE BILATERAL 1/2 VIEWS  LOCATION: [facility]  DATE/TIME: 9/23/2022 8:33 AM    INDICATION:  Chronic pain of right knee, Chronic pain of right knee  COMPARISON: None.         IMPRESSION:   RIGHT KNEE: Normal joint spaces, no acute fracture or dislocation. Mild patella renato.    LEFT KNEE: Normal joint spaces. No acute fracture or dislocation. Mild patella renato.             Mare Laguerre MD, [facility]        Again, thank you for allowing me to participate in the care of your patient.        Sincerely,        Mare Laguerre MD

## 2022-10-14 NOTE — PATIENT INSTRUCTIONS
1. Chronic pain of both knees    2. Chondromalacia of patellofemoral joint, unspecified laterality    3. Patellofemoral maltracking    4. Patella renato    5. Patellar tendinosis      Alireza Jung is a 37 year old male presenting for evaluation of chronic bilateral knee pain which worsened after a jump during volleyball about 6 weeks ago.  History, exam and X-ray findings were reviewed today, consistent with patellofemoral maltracking and chondromalacia (cartilage irritation and wear under the kneecap) as well as patellar tendinosis in the setting of mild patella renato on X-ray. We reviewed treatment options inclusive of pain control, activity modification, bracing and formal physical therapy. Also reviewed timing of advance imaging (ie MRI) and/or surgical referral.     At this time, will proceed with the following plan:  - Referral placed for formal physical therapy - exercises to include neuromuscular/proprioceptive control and VMO strengthening. Please also include pain-free closed chain/isometric/isotonic strengthening with use of modalities (including kinesioptaping) as needed/deemed appropriate with home exercise prescription.  - Continue the patellofemoral brace while playing volleyball.  - Topical diclofenac (Voltaren) gel is an over-the-counter NSAID gel that can be applied to the painful area(s) up to 4 times per day for up to 2 weeks.   - Ice after activity and as needed for pain or swelling.     Please schedule a follow up appointment to see me in 6-8 weeks, or sooner as needed for persistence or worsening of pain. You may call our direct clinic number ([phone]) at any time with questions or concerns.    Mare Laguerre MD, [facility]

## 2022-10-14 NOTE — PROGRESS NOTES
ASSESSMENT & PLAN    1. Chronic pain of both knees    2. Chondromalacia of patellofemoral joint, unspecified laterality    3. Patellofemoral maltracking    4. Patella renato    5. Patellar tendinosis      Alireza Jung is a 37 year old male presenting for evaluation of chronic bilateral knee pain which worsened after a jump during volleyball about 6 weeks ago.  History, exam and X-ray findings were reviewed today, consistent with patellofemoral maltracking and chondromalacia (cartilage irritation and wear under the kneecap) as well as patellar tendinosis in the setting of mild patella renato on X-ray, femoral anteversion and chronic foot/ankle weakness with history of clubfoot s/p revision surgeries as a child. We reviewed treatment options inclusive of pain control, activity modification, bracing and formal physical therapy. Also reviewed timing of advance imaging (ie MRI) and/or surgical referral.     At this time, will proceed with the following plan:  - Referral placed for formal physical therapy - exercises to include neuromuscular/proprioceptive control and VMO strengthening. Please also include pain-free closed chain/isometric/isotonic strengthening with use of modalities (including kinesioptaping) as needed/deemed appropriate with home exercise prescription.  - Continue the patellofemoral brace while playing volleyball.  - Topical diclofenac (Voltaren) gel is an over-the-counter NSAID gel that can be applied to the painful area(s) up to 4 times per day for up to 2 weeks.   - Ice after activity and as needed for pain or swelling.     Please schedule a follow up appointment to see me in 6-8 weeks, or sooner as needed for persistence or worsening of pain. You may call our direct clinic number ([phone]) at any time with questions or concerns.    Mare Laguerre MD, [facility]          -----    SUBJECTIVE  Alireza Jung is a/an 37 year old male who is seen in  "consultation at the request of  Gabino England PA-C for evaluation of bilateral knee pain. The patient is seen by themselves.    Onset: 2 month(s) ago. Patient describes injury as he was playing volleyball and jumped. Afterwards he noticed pain in his knees.  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  Social History     Socioeconomic History     Marital status: Single   Tobacco Use     Smoking status: Never     Smokeless tobacco: Never   Substance and Sexual Activity     Alcohol use: Yes     Alcohol/week: 12.0 standard drinks         Patient's past medical, surgical, social, and family histories were reviewed today and no changes are noted.    REVIEW OF SYSTEMS:  10 point ROS is negative other than symptoms noted above in HPI, Past Medical History or as stated below  Constitutional: NEGATIVE for fever, chills, change in weight  Skin: NEGATIVE for worrisome rashes, moles or lesions  GI/: NEGATIVE for bowel or bladder changes  Neuro: NEGATIVE for weakness, dizziness or paresthesias    OBJECTIVE:  /86   Ht 1.721 m (5' 7.75\")   Wt 85.3 kg (188 lb)   BMI 28.80 kg/m     General: healthy, alert and in no distress  HEENT: no scleral icterus or conjunctival erythema  Skin: no suspicious lesions or rash. No jaundice.  CV: no pedal edema  Resp: normal respiratory effort without conversational dyspnea   Psych: normal mood and affect  Gait: normal steady gait with appropriate " coordination and balance  Neuro: Normal light sensory exam of lower extremity  MSK:  BILATERAL KNEE  Inspection:    Genu valgum, femoral retroversion (L>R). Bilateral pes planus L>R  Palpation:    Tender about the proximal patellar tendon, lateral patellar facet. Remainder of bony and ligamentous landmarks are nontender.    Trace effusion is present    Patellofemoral crepitus is Present  Range of Motion:     00 extension to 1350 flexion - pain at end range  Strength:    Quadriceps 5-/5 and hamstrings 5-/5    Extensor mechanism intact    Dynamic knee valgus with single leg squat bilaterally  Special Tests:    Positive: Patellar grind, patellar inhibition    Negative: J sign, patellar apprehension, MCL/valgus stress (0 & 30 deg), LCL/varus stress (0 & 30 deg), Lachman's, anterior drawer, posterior drawer, Darrin's, Thessaly's    Independent visualization of the below image:  Recent Results (from the past 24 hour(s))   XR Knee Bilateral 1/2 vw    Narrative    KNEE ONE-TWO VIEWS BILATERAL  10/14/2022 1:50 PM     HISTORY: bilateral sunrise view; Chondromalacia of patellofemoral  joint, unspecified laterality  COMPARISON: None.      Impression    IMPRESSION: No acute fracture or malalignment. There is normal  patellofemoral compartment joint spacing bilaterally. No significant  prepatellar soft tissue swelling.    PINA PATIÑO MD         SYSTEM ID:  [id number]     Results for orders placed or performed in visit on 09/23/22   XR Knee Bilateral 1/2 Views    Narrative    EXAM: XR KNEE BILATERAL 1/2 VIEWS  LOCATION: [facility]  DATE/TIME: 9/23/2022 8:33 AM    INDICATION:  Chronic pain of right knee, Chronic pain of right knee  COMPARISON: None.        IMPRESSION:   RIGHT KNEE: Normal joint spaces, no acute fracture or dislocation. Mild patella renato.    LEFT KNEE: Normal joint spaces. No acute fracture or dislocation. Mild patella renato.             Mare Laguerre MD, [facility]  [facility]

## 2022-10-24 ENCOUNTER — THERAPY VISIT (OUTPATIENT)
Dept: PHYSICAL THERAPY | Facility: CLINIC | Age: 37
End: 2022-10-24
Attending: STUDENT IN AN ORGANIZED HEALTH CARE EDUCATION/TRAINING PROGRAM
Payer: COMMERCIAL

## 2022-10-24 DIAGNOSIS — M25.562 CHRONIC PAIN OF BOTH KNEES: ICD-10-CM

## 2022-10-24 DIAGNOSIS — M22.2X9 PATELLOFEMORAL MALTRACKING: ICD-10-CM

## 2022-10-24 DIAGNOSIS — Q68.2 PATELLA ALTA: ICD-10-CM

## 2022-10-24 DIAGNOSIS — M22.40 CHONDROMALACIA OF PATELLOFEMORAL JOINT, UNSPECIFIED LATERALITY: ICD-10-CM

## 2022-10-24 DIAGNOSIS — M76.50 PATELLAR TENDINOSIS: ICD-10-CM

## 2022-10-24 DIAGNOSIS — M25.561 CHRONIC PAIN OF BOTH KNEES: ICD-10-CM

## 2022-10-24 DIAGNOSIS — G89.29 CHRONIC PAIN OF BOTH KNEES: ICD-10-CM

## 2022-10-24 PROCEDURE — 97161 PT EVAL LOW COMPLEX 20 MIN: CPT | Mod: GP | Performed by: PHYSICAL THERAPIST

## 2022-10-24 PROCEDURE — 97110 THERAPEUTIC EXERCISES: CPT | Mod: GP | Performed by: PHYSICAL THERAPIST

## 2022-10-24 ASSESSMENT — ACTIVITIES OF DAILY LIVING (ADL)
PAIN: THE SYMPTOM AFFECTS MY ACTIVITY MODERATELY
SIT WITH YOUR KNEE BENT: ACTIVITY IS MINIMALLY DIFFICULT
RISE FROM A CHAIR: ACTIVITY IS SOMEWHAT DIFFICULT
WALK: ACTIVITY IS MINIMALLY DIFFICULT
GO DOWN STAIRS: ACTIVITY IS MINIMALLY DIFFICULT
HOW_WOULD_YOU_RATE_THE_OVERALL_FUNCTION_OF_YOUR_KNEE_DURING_YOUR_USUAL_DAILY_ACTIVITIES?: ABNORMAL
KNEE_ACTIVITY_OF_DAILY_LIVING_SCORE: 57.14
SWELLING: I DO NOT HAVE THE SYMPTOM
GO UP STAIRS: ACTIVITY IS SOMEWHAT DIFFICULT
STAND: ACTIVITY IS MINIMALLY DIFFICULT
WEAKNESS: THE SYMPTOM AFFECTS MY ACTIVITY SEVERELY
STIFFNESS: I DO NOT HAVE THE SYMPTOM
SQUAT: ACTIVITY IS VERY DIFFICULT
HOW_WOULD_YOU_RATE_THE_CURRENT_FUNCTION_OF_YOUR_KNEE_DURING_YOUR_USUAL_DAILY_ACTIVITIES_ON_A_SCALE_FROM_0_TO_100_WITH_100_BEING_YOUR_LEVEL_OF_KNEE_FUNCTION_PRIOR_TO_YOUR_INJURY_AND_0_BEING_THE_INABILITY_TO_PERFORM_ANY_OF_YOUR_USUAL_DAILY_ACTIVITIES?: 50
GIVING WAY, BUCKLING OR SHIFTING OF KNEE: THE SYMPTOM AFFECTS MY ACTIVITY MODERATELY
RAW_SCORE: 40
KNEEL ON THE FRONT OF YOUR KNEE: ACTIVITY IS VERY DIFFICULT
KNEE_ACTIVITY_OF_DAILY_LIVING_SUM: 40
LIMPING: THE SYMPTOM AFFECTS MY ACTIVITY SEVERELY
AS_A_RESULT_OF_YOUR_KNEE_INJURY,_HOW_WOULD_YOU_RATE_YOUR_CURRENT_LEVEL_OF_DAILY_ACTIVITY?: NEARLY NORMAL

## 2022-10-24 NOTE — PROGRESS NOTES
Physical Therapy Initial Evaluation  Subjective:  The history is provided by the patient. No  was used.   Patient Health History  Alireza Jung being seen for B knee pain .     Date of Onset: 2 months ago playing volleyball.   Problem occurred: playing volleyball   Pain is reported as 6/10 on pain scale.  General health as reported by patient is good.  Pertinent medical history includes: depression and rheumatoid arthritis.     Medical allergies: latex.   Surgeries include:  Orthopedic surgery. Other surgery history details: club feet as a child.    Current medications:  Anti-depressants.    Current occupation is Building maintenance.   Primary job tasks include:  Computer work, lifting/carrying and pushing/pulling.                  Therapist Generated HPI Evaluation  Problem details: Pt. complains of bilateral knee pain  that has been present for 2 months after playing volleyball.  PT order dated 10/14/22.      .         Type of problem:  Bilateral knees.    This is a new condition.  Condition occurred with:  A jump.  Where condition occurred: during recreation/sport.  Patient reports pain:  Anterior and in the joint.  Pain is described as aching and is intermittent.  Pain radiates to:  Knee.   Since onset symptoms are unchanged.  Associated symptoms:  Loss of motion/stiffness and loss of strength. Symptoms are exacerbated by bending/squatting, kneeling, standing, weight bearing, ascending stairs and descending stairs  and relieved by rest, ice and bracing/immobilizing.  Special tests included:  X-ray.    Restrictions due to condition include:  Working in normal job without restrictions.  Barriers include:  None as reported by patient.                        Objective:  Standing Alignment:                Ankle/Foot:  Pes planus L and pes cavus R                                                          Knee Evaluation:  ROM:  AROM: normal  PROM: normal            Strength:     Extension:  Left:  3/5   Pain:      Right: 3/5   Pain:  Flexion:  Left: 3/5   Pain:      Right: 3/5   Pain:    Quad Set Left: Fair    Pain:   Quad Set Right: Fair    Pain:  Ligament Testing:  Normal                  Palpation:    Left knee tenderness present at:  Patellar Tendon  Left knee tenderness not present at:  Medial Joint Line; Lateral Joint Line; Patellar Medial; Patellar Lateral; Patellar Superior and Patellar Inferior  Right knee tenderness present at:  Patellar Tendon  Right knee tenderness not present at:  Medial Joint Line; Lateral Joint Line; Patellar Medial; Patellar Lateral; Patellar Superior and Patellar Inferior  Edema:  Normal    Mobility Testing:        Patellofemoral Lateral:  Left: hypermobile    Right: hypermobile  Patellofemoral Superior:  Left: hypermobile    Right: hypermobile          General     ROS    Assessment/Plan:    Patient is a 37 year old male with both sides knee complaints.    Patient has the following significant findings with corresponding treatment plan.                Diagnosis 1:  B knee   Pain -  hot/cold therapy, self management, education, directional preference exercise and home program  Decreased strength - therapeutic exercise and therapeutic activities  Impaired balance - neuro re-education and therapeutic activities  Decreased proprioception - neuro re-education and therapeutic activities  Impaired muscle performance - neuro re-education  Decreased function - therapeutic activities    Therapy Evaluation Codes:   1) Clinical presentation characteristics are:   Stable/Uncomplicated.  2) Decision-Making    Low complexity using standardized patient assessment instrument and/or measureable assessment of functional outcome.  Cumulative Therapy Evaluation is: Low complexity.    Previous and current functional limitations:  (See Goal Flow Sheet for this information)    Short term and Long term goals: (See Goal Flow Sheet for this information)     Communication ability:  Patient appears to be  able to clearly communicate and understand verbal and written communication and follow directions correctly.  Treatment Explanation - The following has been discussed with the patient:   RX ordered/plan of care  Anticipated outcomes  Possible risks and side effects  This patient would benefit from PT intervention to resume normal activities.   Rehab potential is good.    Frequency:  1 X week, once daily  Duration:  for 12 weeks  Discharge Plan:  Achieve all LTG.  Independent in home treatment program.  Reach maximal therapeutic benefit.    Please refer to the daily flowsheet for treatment today, total treatment time and time spent performing 1:1 timed codes.

## 2022-10-29 ENCOUNTER — HEALTH MAINTENANCE LETTER (OUTPATIENT)
Age: 37
End: 2022-10-29

## 2023-01-23 ENCOUNTER — OFFICE VISIT (OUTPATIENT)
Dept: ALLERGY | Facility: CLINIC | Age: 38
End: 2023-01-23
Attending: PHYSICIAN ASSISTANT
Payer: COMMERCIAL

## 2023-01-23 VITALS
RESPIRATION RATE: 16 BRPM | HEIGHT: 67 IN | HEART RATE: 76 BPM | BODY MASS INDEX: 28.25 KG/M2 | OXYGEN SATURATION: 99 % | WEIGHT: 180 LBS

## 2023-01-23 DIAGNOSIS — L50.8 CHRONIC AUTOIMMUNE URTICARIA: Primary | ICD-10-CM

## 2023-01-23 DIAGNOSIS — J30.1 SEASONAL ALLERGIC RHINITIS DUE TO POLLEN: ICD-10-CM

## 2023-01-23 LAB
DEPRECATED CALCIDIOL+CALCIFEROL SERPL-MC: 47 UG/L (ref 20–75)
TSH SERPL DL<=0.005 MIU/L-ACNC: 2.25 UIU/ML (ref 0.3–4.2)

## 2023-01-23 PROCEDURE — 82785 ASSAY OF IGE: CPT | Performed by: ALLERGY & IMMUNOLOGY

## 2023-01-23 PROCEDURE — 99204 OFFICE O/P NEW MOD 45 MIN: CPT | Performed by: ALLERGY & IMMUNOLOGY

## 2023-01-23 PROCEDURE — 82306 VITAMIN D 25 HYDROXY: CPT | Performed by: ALLERGY & IMMUNOLOGY

## 2023-01-23 PROCEDURE — 36415 COLL VENOUS BLD VENIPUNCTURE: CPT | Performed by: ALLERGY & IMMUNOLOGY

## 2023-01-23 PROCEDURE — 84443 ASSAY THYROID STIM HORMONE: CPT | Performed by: ALLERGY & IMMUNOLOGY

## 2023-01-23 PROCEDURE — 86003 ALLG SPEC IGE CRUDE XTRC EA: CPT | Performed by: ALLERGY & IMMUNOLOGY

## 2023-01-23 RX ORDER — AZELASTINE 1 MG/ML
1 SPRAY, METERED NASAL 2 TIMES DAILY
Qty: 30 ML | Refills: 3 | Status: SHIPPED | OUTPATIENT
Start: 2023-01-23

## 2023-01-23 NOTE — PATIENT INSTRUCTIONS
Chronic autoimmune hives    (Cetirizine) 10 mg --up to 2 tabs twice daily     Reassess 2-4 weeks    Many patients resolve within a few years    Astelin 2 sprays each nostril twice daily as needed    Check allergy testing    Adilson med Sinus Rinse

## 2023-01-23 NOTE — LETTER
1/23/2023         RE: Alireza Jung  [address] [place] [id number]        Dear Colleague,    Thank you for referring your patient, Alireza Jung, to the [facility]. Please see a copy of my visit note below.          Subjective   Alireza is a 37 year old, presenting for the following health issues:  Allergy Consult (Hives from Covid since December of 2021)      HPI     Chief complaint: Hives    History of present illness: This is a pleasant 37-year-old gentleman I was asked to see for evaluation by Gabino England in regards to hives.  Patient states that he had COVID in December 2021.  Since that time he has had hives on a daily basis.  He takes a 10 mg Zyrtec and that adequately controls symptoms.  He describes the hives as red itchy raised lesions.  He states if something would brushed against his skin he could see where those marks would be on his skin.  He states he thinks the lesions last less than a day but is not sure.  No bruising to the hives.  No lip swelling or eye swelling.  He is never had hives before.  He has had COVID again since that time in May but states it did not seem to aggravate the hives.  He does not take any over-the-counter supplements except for vitamin D.  He does not use nonsteroidal anti-inflammatory drugs on a regular basis.  He does have a history of reflux.  Otherwise, medical history is negative.  No family history of urticaria.  He notes that things that brushed up against the skin worsen hives.  The hives have started to improve.    He does note a history of drainage.  He states he has frequent throat clearing.  He states it worsens in the fall but does occur throughout the year.  He has no history of asthma but he states that his hives are at their worst he feels that he has a little bit of heavy breathing.  This does not wake him up from sleep.  He has never been allergy tested.  He does not use any allergy medication for this symptom  "regularly and has had the symptoms prior to COVID.  He states he did use Flonase but he states is hard to use this consistently.    Past medical history: Reflux which he states is not always controlled, anxiety    Social history: He has 1 cat, 2 dogs, 3 reptiles, non-smoker, lives in a home with central air, no basement, works in building maintenance    Family history: Negative for asthma and allergies, as well as urticaria    Review of Systems   Constitutional, HEENT, cardiovascular, pulmonary, gi and gu systems are negative, except as otherwise noted.     Objective    Pulse 76   Resp 16   Ht 1.702 m (5' 7\")   Wt 81.6 kg (180 lb)   SpO2 99%   BMI 28.19 kg/m    Body mass index is 28.19 kg/m .  Physical Exam   Gen: Pleasant male not in acute distress  HEENT: Eyes no erythema of the bulbar or palpebral conjunctiva, no edema. Ears: TMs well visualized, no effusions. Nose: No congestion, mucosa normal. Mouth: Throat clear, no lip or tongue edema.   Cardiac: Regular rate and rhythm, no murmurs, rubs or gallops  Respiratory: Clear to auscultation bilaterally, no adventitious breath sounds  Lymph: No supraclavicular or cervical lymphadenopathy  Skin: Dermatographia, some red lesions on the inner aspect of the left forearm and a possible hive on the right inner forearm  Psych: Alert and oriented times 3    Impression report and plan:    1.  Chronic urticaria    The patient has chronic urticaria.  Chronic urticaria is not due to a specific allergen.  Recommend systemic work-up including TSH, vitamin D level. .Many patients with chronic urticaria resolve within a few years.  Reviewed exacerbating and concerning signs of chronic urticaria.     2.  Postnasal drainage    Recommended Astelin nasal spray.  Check specific IgE to the [place] respiratory disease panel.  I will contact patient once testing returns.  Recommended nasal rinses as well.            Again, thank you for allowing me to participate in the care of your " patient.        Sincerely,        Janice IRIZARRY MD

## 2023-01-23 NOTE — PROGRESS NOTES
Jazmin Lay is a 37 year old, presenting for the following health issues:  Allergy Consult (Hives from Covid since December of 2021)      HPI     Chief complaint: Hives    History of present illness: This is a pleasant 37-year-old gentleman I was asked to see for evaluation by Gabino England in regards to hives.  Patient states that he had COVID in December 2021.  Since that time he has had hives on a daily basis.  He takes a 10 mg Zyrtec and that adequately controls symptoms.  He describes the hives as red itchy raised lesions.  He states if something would brushed against his skin he could see where those marks would be on his skin.  He states he thinks the lesions last less than a day but is not sure.  No bruising to the hives.  No lip swelling or eye swelling.  He is never had hives before.  He has had COVID again since that time in May but states it did not seem to aggravate the hives.  He does not take any over-the-counter supplements except for vitamin D.  He does not use nonsteroidal anti-inflammatory drugs on a regular basis.  He does have a history of reflux.  Otherwise, medical history is negative.  No family history of urticaria.  He notes that things that brushed up against the skin worsen hives.  The hives have started to improve.    He does note a history of drainage.  He states he has frequent throat clearing.  He states it worsens in the fall but does occur throughout the year.  He has no history of asthma but he states that his hives are at their worst he feels that he has a little bit of heavy breathing.  This does not wake him up from sleep.  He has never been allergy tested.  He does not use any allergy medication for this symptom regularly and has had the symptoms prior to COVID.  He states he did use Flonase but he states is hard to use this consistently.    Past medical history: Reflux which he states is not always controlled, anxiety    Social history: He has 1 cat, 2 dogs, 3  "reptiles, non-smoker, lives in a home with central air, no basement, works in building maintenance    Family history: Negative for asthma and allergies, as well as urticaria    Review of Systems   Constitutional, HEENT, cardiovascular, pulmonary, gi and gu systems are negative, except as otherwise noted.      Objective    Pulse 76   Resp 16   Ht 1.702 m (5' 7\")   Wt 81.6 kg (180 lb)   SpO2 99%   BMI 28.19 kg/m    Body mass index is 28.19 kg/m .  Physical Exam   Gen: Pleasant male not in acute distress  HEENT: Eyes no erythema of the bulbar or palpebral conjunctiva, no edema. Ears: TMs well visualized, no effusions. Nose: No congestion, mucosa normal. Mouth: Throat clear, no lip or tongue edema.   Cardiac: Regular rate and rhythm, no murmurs, rubs or gallops  Respiratory: Clear to auscultation bilaterally, no adventitious breath sounds  Lymph: No supraclavicular or cervical lymphadenopathy  Skin: Dermatographia, some red lesions on the inner aspect of the left forearm and a possible hive on the right inner forearm  Psych: Alert and oriented times 3    Impression report and plan:    1.  Chronic urticaria    The patient has chronic urticaria.  Chronic urticaria is not due to a specific allergen.  Recommend systemic work-up including TSH, vitamin D level. .Many patients with chronic urticaria resolve within a few years.  Reviewed exacerbating and concerning signs of chronic urticaria.      2.  Postnasal drainage    Recommended Astelin nasal spray.  Check specific IgE to the [place] respiratory disease panel.  I will contact patient once testing returns.  Recommended nasal rinses as well.        "

## 2023-01-24 LAB
A ALTERNATA IGE QN: <0.1 KU(A)/L
A FUMIGATUS IGE QN: <0.1 KU(A)/L
BERMUDA GRASS IGE QN: 2.22 KU(A)/L
C HERBARUM IGE QN: <0.1 KU(A)/L
CAT DANDER IGG QN: <0.1 KU(A)/L
CEDAR IGE QN: 2.05 KU(A)/L
COMMON RAGWEED IGE QN: 2.39 KU(A)/L
COTTONWOOD IGE QN: 2.1 KU(A)/L
D FARINAE IGE QN: 0.68 KU(A)/L
D PTERONYSS IGE QN: <0.1 KU(A)/L
DOG DANDER+EPITH IGE QN: 0.11 KU(A)/L
IGE SERPL-ACNC: 271 KU/L (ref 0–114)
MAPLE IGE QN: 2.16 KU(A)/L
MARSH ELDER IGE QN: 2.22 KU(A)/L
MOUSE URINE PROT IGE QN: <0.1 KU(A)/L
NETTLE IGE QN: 1.77 KU(A)/L
P NOTATUM IGE QN: <0.1 KU(A)/L
ROACH IGE QN: 1.65 KU(A)/L
SALTWORT IGE QN: 2.35 KU(A)/L
SILVER BIRCH IGE QN: 1.71 KU(A)/L
TIMOTHY IGE QN: 2.38 KU(A)/L
WHITE ASH IGE QN: 2.45 KU(A)/L
WHITE ELM IGE QN: 2.24 KU(A)/L
WHITE MULBERRY IGE QN: 1.53 KU(A)/L
WHITE OAK IGE QN: 2.18 KU(A)/L

## 2023-03-11 ENCOUNTER — MYC MEDICAL ADVICE (OUTPATIENT)
Dept: FAMILY MEDICINE | Facility: CLINIC | Age: 38
End: 2023-03-11
Payer: COMMERCIAL

## 2023-03-11 DIAGNOSIS — B00.9 HERPES SIMPLEX VIRUS (HSV) INFECTION: Primary | ICD-10-CM

## 2023-03-13 RX ORDER — VALACYCLOVIR HYDROCHLORIDE 500 MG/1
500 TABLET, FILM COATED ORAL DAILY
Qty: 30 TABLET | Refills: 0 | Status: SHIPPED | OUTPATIENT
Start: 2023-03-13 | End: 2024-09-05

## 2023-03-13 NOTE — TELEPHONE ENCOUNTER
Medication: valacyclovir 500mg   Last Date Filled: 1/14/22  Last appointment addressing medication: 9/23/22  Last B/P:  BP Readings from Last 3 Encounters:   10/14/22 132/86   09/23/22 110/70   01/14/22 124/88     Last labs pertaining to refill: 1/23/23      Pend medication and associate diagnosis before routing to Provider for review.       If patient has not been seen in over 1 year, pend 30 day supply and notify patient they are due for an appointment before any further refills.

## 2023-04-02 ENCOUNTER — HEALTH MAINTENANCE LETTER (OUTPATIENT)
Age: 38
End: 2023-04-02

## 2023-05-08 DIAGNOSIS — K21.00 GASTROESOPHAGEAL REFLUX DISEASE WITH ESOPHAGITIS WITHOUT HEMORRHAGE: ICD-10-CM

## 2023-05-08 NOTE — TELEPHONE ENCOUNTER
Refill Request  Medication name: Pending Prescriptions:                       Disp   Refills    omeprazole (PRILOSEC) 20 MG DR capsule    90 cap*2          Requested Pharmacy:  [id number] - [place], [place] - [address] [place] [address] AT [facility] [address]

## 2023-05-09 NOTE — TELEPHONE ENCOUNTER
"Last Written Prescription Date:  6/25/22  Last Fill Quantity: 90,  # refills: 2   Last office visit provider:  9/23/22     Requested Prescriptions   Pending Prescriptions Disp Refills     omeprazole (PRILOSEC) 20 MG DR capsule 90 capsule 2       PPI Protocol Passed - 5/9/2023  3:56 PM        Passed - Not on Clopidogrel (unless Pantoprazole ordered)        Passed - No diagnosis of osteoporosis on record        Passed - Recent (12 mo) or future (30 days) visit within the authorizing provider's specialty     Patient has had an office visit with the authorizing provider or a provider within the authorizing providers department within the previous 12 mos or has a future within next 30 days. See \"Patient Info\" tab in inbasket, or \"Choose Columns\" in Meds & Orders section of the refill encounter.              Passed - Medication is active on med list        Passed - Patient is age 18 or older             Micah Hansen RN 05/09/23 3:56 PM  "

## 2023-06-22 ENCOUNTER — TELEPHONE (OUTPATIENT)
Dept: FAMILY MEDICINE | Facility: CLINIC | Age: 38
End: 2023-06-22
Payer: COMMERCIAL

## 2023-06-22 NOTE — TELEPHONE ENCOUNTER
Reason for Call:  Appointment Request    Patient requesting this type of appt:  right shoulder pain     Requested provider: Gabino England    Reason patient unable to be scheduled: Not within requested timeframe    When does patient want to be seen/preferred time: Same day    Comments: Patient would like an appt asap.     [facility] or would you prefer to receive a phone call?:   Patient would prefer a phone call   Okay to leave a detailed message?: Yes at Home number on file [phone] (home)    Call taken on 6/22/2023 at 11:46 AM by Kristin Morris

## 2023-06-26 ENCOUNTER — OFFICE VISIT (OUTPATIENT)
Dept: FAMILY MEDICINE | Facility: CLINIC | Age: 38
End: 2023-06-26
Payer: COMMERCIAL

## 2023-06-26 ENCOUNTER — MYC MEDICAL ADVICE (OUTPATIENT)
Dept: FAMILY MEDICINE | Facility: CLINIC | Age: 38
End: 2023-06-26

## 2023-06-26 VITALS
TEMPERATURE: 98.3 F | RESPIRATION RATE: 12 BRPM | BODY MASS INDEX: 31.23 KG/M2 | SYSTOLIC BLOOD PRESSURE: 136 MMHG | DIASTOLIC BLOOD PRESSURE: 84 MMHG | HEIGHT: 67 IN | WEIGHT: 199 LBS | OXYGEN SATURATION: 95 % | HEART RATE: 94 BPM

## 2023-06-26 DIAGNOSIS — M25.511 ACUTE PAIN OF RIGHT SHOULDER: ICD-10-CM

## 2023-06-26 DIAGNOSIS — M25.511 ACUTE PAIN OF RIGHT SHOULDER: Primary | ICD-10-CM

## 2023-06-26 PROCEDURE — 99213 OFFICE O/P EST LOW 20 MIN: CPT | Performed by: PHYSICIAN ASSISTANT

## 2023-06-26 RX ORDER — PREDNISONE 20 MG/1
40 TABLET ORAL DAILY
Qty: 5 TABLET | Refills: 0 | Status: SHIPPED | OUTPATIENT
Start: 2023-06-26 | End: 2023-06-27

## 2023-06-26 ASSESSMENT — ENCOUNTER SYMPTOMS
NEUROLOGICAL NEGATIVE: 1
CONSTITUTIONAL NEGATIVE: 1
CARDIOVASCULAR NEGATIVE: 1
RESPIRATORY NEGATIVE: 1

## 2023-06-26 NOTE — PROGRESS NOTES
Assessment & Plan       ICD-10-CM    1. Acute pain of right shoulder  M25.511 predniSONE (DELTASONE) 20 MG tablet        #1 right shoulder pain  He has been having right shoulder pain since fall down his stairs about 2 weeks ago.  He slipped on his last step and landed on his right side  He was evaluated in the emergency room where he underwent an x-ray of his shoulder and hips.  He the patient reports that his x-ray was negative.  He continues to have pain especially with overhead movements and flexion of the shoulder.  He also has palpable tenderness in the bicep groove into the posterior aspect of the shoulder.  CMS to the right upper extremity was otherwise intact no weakness.  Strong hand grasp.  I do suspect either a tendinitis of the rotator cuff or bicep tendinitis.  We will start with doing prednisone 40 mg for 5 days.  Continue with Tylenol, ibuprofen and ice.  We will do restrictions at work limit limited overhead movements and no lifting more than 10 pain pounds for 2 weeks.  If things are better he is able to return to work without restrictions at that time.  If he continues to have pain despite prednisone and rest we will move forward with a MRI of his shoulder at that time.    Gabino England PA-C  [facility]   Alireza is a 38 year old, presenting for the following health issues:  Shoulder Injury (Pt reports falling down some stairs about 1.5 weeks ago. Injured R hip and shoulder. Hasn not gotten better. Went to  and there were no fractures or breaks)        6/26/2023     1:24 PM   Additional Questions   Roomed by Sandhya Marrero   Accompanied by none     Alireza is a pleasant 38-year-old male who presents to the clinic today for urgent care follow-up.  He was seen in urgent care about a week and a half ago after falling and slipping on his last step.  He states he was walking down the steps and slipped off the bottom step and fell onto his right side injuring  "his right shoulder and hip.  The next day he did go into the urgency room where they did an x-ray of his hip and his shoulder.  Both of the x-rays of his shoulders and hip did not show any evidence of fractures or dislocation.  He continues to have right shoulder pain although his left hip pain has improved.  He states that the pain is worse with overhead most movements.  Majority of the pain is in the posterior and the anterior aspect of the shoulder.  He has not noticed any weakness in the hand or weakness in the right shoulder.  He did have a bruise to his left hip but he says that is getting better.  He is not having any lower back pain.  He has been utilizing ibuprofen, Tylenol and ice which has been helping.    History of Present Illness       Reason for visit:  Follow up to a fall injury affecting right hip and should. Continuing pain.  Symptom onset:  1-2 weeks ago  Symptoms include:  Right shoulder pain and weakness.  Symptom intensity:  Moderate  Symptom progression:  Staying the same  Had these symptoms before:  No  What makes it worse:  To much physical activity  What makes it better:  Ice and relaxing    He eats 2-3 servings of fruits and vegetables daily.He consumes 1 sweetened beverage(s) daily.He exercises with enough effort to increase his heart rate 10 to 19 minutes per day.  He exercises with enough effort to increase his heart rate 3 or less days per week.   He is taking medications regularly.         Review of Systems   Constitutional: Negative.    HENT: Negative.    Respiratory: Negative.    Cardiovascular: Negative.    Musculoskeletal:        Right shoulder pain since fall 2 weeks ago. Negative xrays in    Neurological: Negative.             Objective    /84 (BP Location: Left arm, Patient Position: Sitting, Cuff Size: Adult Regular)   Pulse 94   Temp 98.3  F (36.8  C) (Oral)   Resp 12   Ht 1.702 m (5' 7\")   Wt 90.3 kg (199 lb)   SpO2 95%   BMI 31.17 kg/m    Body mass index is " 31.17 kg/m .  Physical Exam  Vitals and nursing note reviewed.   Constitutional:       Appearance: Normal appearance.   HENT:      Head: Normocephalic and atraumatic.   Eyes:      Conjunctiva/sclera: Conjunctivae normal.   Cardiovascular:      Rate and Rhythm: Normal rate and regular rhythm.      Heart sounds: No murmur heard.     No friction rub. No gallop.   Pulmonary:      Effort: Pulmonary effort is normal.      Breath sounds: No wheezing, rhonchi or rales.   Musculoskeletal:      Comments: Right shoulder: No palpable tenderness to bony landmarks.  Full range of motion but does elicit pain with flexion of the shoulder.  Positive Arriaga and Neer's test.  No weakness to the left upper extremity.  Strong hand grasp.  No neurological deficits.  Palpable tenderness over the bicep groove.   Skin:     General: Skin is warm and dry.   Neurological:      Mental Status: He is alert.   Psychiatric:         Mood and Affect: Mood normal.         Behavior: Behavior normal.         Thought Content: Thought content normal.         Judgment: Judgment normal.

## 2023-06-26 NOTE — LETTER
June 26, 2023      Alireza Jung  [address] [place] [id number]        To Whom It May Concern:    Alireza Jung was seen in our clinic. He may return to work with the following: limited to light duty - lifting no greater than 10 pounds and no use of arms over shoulders for 2 week ( 7/11/2023).      Sincerely,        Gabino England PA-C

## 2023-06-27 RX ORDER — PREDNISONE 20 MG/1
40 TABLET ORAL DAILY
Qty: 5 TABLET | Refills: 0 | Status: SHIPPED | OUTPATIENT
Start: 2023-06-27 | End: 2023-10-25

## 2023-06-27 NOTE — TELEPHONE ENCOUNTER
6/26/23 prednisone was #5 with directions to take two tablets daily.   Please review and advise on what patient should have picked up.

## 2023-10-25 ENCOUNTER — OFFICE VISIT (OUTPATIENT)
Dept: FAMILY MEDICINE | Facility: CLINIC | Age: 38
End: 2023-10-25
Payer: COMMERCIAL

## 2023-10-25 VITALS
HEART RATE: 102 BPM | HEIGHT: 67 IN | TEMPERATURE: 98.5 F | WEIGHT: 198 LBS | BODY MASS INDEX: 31.08 KG/M2 | SYSTOLIC BLOOD PRESSURE: 138 MMHG | RESPIRATION RATE: 16 BRPM | OXYGEN SATURATION: 97 % | DIASTOLIC BLOOD PRESSURE: 80 MMHG

## 2023-10-25 DIAGNOSIS — F43.21 SITUATIONAL DEPRESSION: ICD-10-CM

## 2023-10-25 DIAGNOSIS — R41.840 DECREASED ATTENTION SPAN: ICD-10-CM

## 2023-10-25 DIAGNOSIS — R40.0 DAYTIME SOMNOLENCE: ICD-10-CM

## 2023-10-25 DIAGNOSIS — Z00.00 ANNUAL PHYSICAL EXAM: Primary | ICD-10-CM

## 2023-10-25 DIAGNOSIS — Z13.1 SCREENING FOR DIABETES MELLITUS: ICD-10-CM

## 2023-10-25 DIAGNOSIS — E78.5 HYPERLIPIDEMIA LDL GOAL <100: ICD-10-CM

## 2023-10-25 DIAGNOSIS — F41.1 ANXIETY STATE: ICD-10-CM

## 2023-10-25 DIAGNOSIS — B00.9 HERPES SIMPLEX VIRUS (HSV) INFECTION: ICD-10-CM

## 2023-10-25 DIAGNOSIS — K21.00 GASTROESOPHAGEAL REFLUX DISEASE WITH ESOPHAGITIS WITHOUT HEMORRHAGE: ICD-10-CM

## 2023-10-25 LAB
CHOLEST SERPL-MCNC: 307 MG/DL
ERYTHROCYTE [DISTWIDTH] IN BLOOD BY AUTOMATED COUNT: 13.8 % (ref 10–15)
HBA1C MFR BLD: 5.4 % (ref 0–5.6)
HCT VFR BLD AUTO: 49.5 % (ref 40–53)
HDLC SERPL-MCNC: 38 MG/DL
HGB BLD-MCNC: 16.3 G/DL (ref 13.3–17.7)
LDLC SERPL CALC-MCNC: ABNORMAL MG/DL
LDLC SERPL DIRECT ASSAY-MCNC: 176 MG/DL
MCH RBC QN AUTO: 26.4 PG (ref 26.5–33)
MCHC RBC AUTO-ENTMCNC: 32.9 G/DL (ref 31.5–36.5)
MCV RBC AUTO: 80 FL (ref 78–100)
NONHDLC SERPL-MCNC: 269 MG/DL
PLATELET # BLD AUTO: 309 10E3/UL (ref 150–450)
RBC # BLD AUTO: 6.17 10E6/UL (ref 4.4–5.9)
TRIGL SERPL-MCNC: 679 MG/DL
TSH SERPL DL<=0.005 MIU/L-ACNC: 1.96 UIU/ML (ref 0.3–4.2)
VIT D+METAB SERPL-MCNC: 21 NG/ML (ref 20–50)
WBC # BLD AUTO: 11.2 10E3/UL (ref 4–11)

## 2023-10-25 PROCEDURE — 36415 COLL VENOUS BLD VENIPUNCTURE: CPT | Performed by: PHYSICIAN ASSISTANT

## 2023-10-25 PROCEDURE — 80061 LIPID PANEL: CPT | Performed by: PHYSICIAN ASSISTANT

## 2023-10-25 PROCEDURE — 85027 COMPLETE CBC AUTOMATED: CPT | Performed by: PHYSICIAN ASSISTANT

## 2023-10-25 PROCEDURE — 83721 ASSAY OF BLOOD LIPOPROTEIN: CPT | Mod: 59 | Performed by: PHYSICIAN ASSISTANT

## 2023-10-25 PROCEDURE — 99214 OFFICE O/P EST MOD 30 MIN: CPT | Mod: 25 | Performed by: PHYSICIAN ASSISTANT

## 2023-10-25 PROCEDURE — 99395 PREV VISIT EST AGE 18-39: CPT | Mod: 25 | Performed by: PHYSICIAN ASSISTANT

## 2023-10-25 PROCEDURE — 82306 VITAMIN D 25 HYDROXY: CPT | Performed by: PHYSICIAN ASSISTANT

## 2023-10-25 PROCEDURE — 84443 ASSAY THYROID STIM HORMONE: CPT | Performed by: PHYSICIAN ASSISTANT

## 2023-10-25 PROCEDURE — 80053 COMPREHEN METABOLIC PANEL: CPT | Performed by: PHYSICIAN ASSISTANT

## 2023-10-25 PROCEDURE — 83036 HEMOGLOBIN GLYCOSYLATED A1C: CPT | Performed by: PHYSICIAN ASSISTANT

## 2023-10-25 ASSESSMENT — ENCOUNTER SYMPTOMS
WEAKNESS: 0
EYE PAIN: 0
FEVER: 0
HEMATOCHEZIA: 0
CHILLS: 0
NERVOUS/ANXIOUS: 0
DYSURIA: 0
DIZZINESS: 0
HEMATURIA: 0
ABDOMINAL PAIN: 0
NAUSEA: 0
SORE THROAT: 0
HEARTBURN: 0
FREQUENCY: 0
DIARRHEA: 0
MYALGIAS: 0
PALPITATIONS: 0
CONSTIPATION: 0
COUGH: 0
HEADACHES: 0
PARESTHESIAS: 0
ARTHRALGIAS: 0
SHORTNESS OF BREATH: 0
JOINT SWELLING: 0

## 2023-10-25 NOTE — LETTER
October 31, 2023      Alireza Jung  [address] [place] [id number]        Dear ,    We are writing to inform you of your test results.    Complete Blood Count: White blood cell, red blood cell, platelets, and hemoglobin are stable.   Metabolic panel : Kidney function, liver function, electrolytes are stable.    Thyroid function test Your thyroid function is within normal limits.   PSA: Your prostate screening lab was within normal limit   A1c is within normal limits   Cholesterol is very high. This could be off because you were not fasting. I would like you to fast for 8 hours have this rechecked when you have time.   Vit D is within normal limits.     Resulted Orders   CBC with platelets   Result Value Ref Range    WBC Count 11.2 (H) 4.0 - 11.0 10e3/uL    RBC Count 6.17 (H) 4.40 - 5.90 10e6/uL    Hemoglobin 16.3 13.3 - 17.7 g/dL    Hematocrit 49.5 40.0 - 53.0 %    MCV 80 78 - 100 fL    MCH 26.4 (L) 26.5 - 33.0 pg    MCHC 32.9 31.5 - 36.5 g/dL    RDW 13.8 10.0 - 15.0 %    Platelet Count 309 150 - 450 10e3/uL   Comprehensive metabolic panel (BMP + Alb, Alk Phos, ALT, AST, Total. Bili, TP)   Result Value Ref Range    Sodium 137 135 - 145 mmol/L      Comment:      Reference intervals for this test were updated on 09/26/2023 to more accurately reflect our healthy population. There may be differences in the flagging of prior results with similar values performed with this method. Interpretation of those prior results can be made in the context of the updated reference intervals.     Potassium 4.2 3.4 - 5.3 mmol/L    Carbon Dioxide (CO2) 28 22 - 29 mmol/L    Anion Gap 9 7 - 15 mmol/L    Urea Nitrogen 15.5 6.0 - 20.0 mg/dL    Creatinine 1.00 0.67 - 1.17 mg/dL    GFR Estimate >90 >60 mL/min/1.73m2    Calcium 10.1 (H) 8.6 - 10.0 mg/dL    Chloride 100 98 - 107 mmol/L    Glucose 81 70 - 99 mg/dL    Alkaline Phosphatase 117 40 - 129 U/L    AST 29 0 - 45 U/L      Comment:      Reference intervals for this test  were updated on 6/12/2023 to more accurately reflect our healthy population. There may be differences in the flagging of prior results with similar values performed with this method. Interpretation of those prior results can be made in the context of the updated reference intervals.    ALT 54 0 - 70 U/L      Comment:      Reference intervals for this test were updated on 6/12/2023 to more accurately reflect our healthy population. There may be differences in the flagging of prior results with similar values performed with this method. Interpretation of those prior results can be made in the context of the updated reference intervals.      Protein Total 8.2 6.4 - 8.3 g/dL    Albumin 4.6 3.5 - 5.2 g/dL    Bilirubin Total 0.5 <=1.2 mg/dL   Lipid panel reflex to direct LDL Fasting   Result Value Ref Range    Cholesterol 307 (H) <200 mg/dL    Triglycerides 679 (H) <150 mg/dL    Direct Measure HDL 38 (L) >=40 mg/dL    LDL Cholesterol Calculated        Comment:      Cannot estimate LDL when triglyceride exceeds 400 mg/dL    Non HDL Cholesterol 269 (H) <130 mg/dL    Narrative    Cholesterol  Desirable:  <200 mg/dL    Triglycerides  Normal:  Less than 150 mg/dL  Borderline High:  150-199 mg/dL  High:  200-499 mg/dL  Very High:  Greater than or equal to 500 mg/dL    Direct Measure HDL  Female:  Greater than or equal to 50 mg/dL   Male:  Greater than or equal to 40 mg/dL    LDL Cholesterol  Desirable:  <100mg/dL  Above Desirable:  100-129 mg/dL   Borderline High:  130-159 mg/dL   High:  160-189 mg/dL   Very High:  >= 190 mg/dL    Non HDL Cholesterol  Desirable:  130 mg/dL  Above Desirable:  130-159 mg/dL  Borderline High:  160-189 mg/dL  High:  190-219 mg/dL  Very High:  Greater than or equal to 220 mg/dL   Hemoglobin A1c   Result Value Ref Range    Hemoglobin A1C 5.4 0.0 - 5.6 %      Comment:      Normal <5.7%   Prediabetes 5.7-6.4%    Diabetes 6.5% or higher     Note: Adopted from ADA consensus guidelines.   TSH with free T4  reflex   Result Value Ref Range    TSH 1.96 0.30 - 4.20 uIU/mL   Vitamin D Deficiency   Result Value Ref Range    Vitamin D, Total (25-Hydroxy) 21 20 - 50 ng/mL      Comment:      optimum levels    Narrative    Season, race, dietary intake, and treatment affect the concentration of 25-hydroxy-Vitamin D. Values may decrease during winter months and increase during summer months.    Vitamin D determination is routinely performed by an immunoassay specific for 25 hydroxyvitamin D3.  If an individual is on vitamin D2(ergocalciferol) supplementation, please specify 25 OH vitamin D2 and D3 level determination by LCMSMS test VITD23.     LDL cholesterol direct   Result Value Ref Range    LDL Cholesterol Direct 176 (H) <100 mg/dL      Comment:      Age 2-19 years:  Desirable: 0-110 mg/dL   Borderline high: 110-129 mg/dL   High: >= 130 mg/dL    Age 20 years and older:  Desirable: <100mg/dL  Above desirable: 100-129 mg/dL   Borderline high: 130-159 mg/dL   High: 160-189 mg/dL   Very high: >= 190 mg/dL       If you have any questions or concerns, please call the clinic at the number listed above.       Sincerely,      Gabino England PA-C

## 2023-10-25 NOTE — PROGRESS NOTES
"SUBJECTIVE:   CC: Alireza is an 38 year old who presents for preventative health visit.       10/25/2023     1:42 PM   Additional Questions   Roomed by Sandhya Scales   Accompanied by none       Alireza is a pleasant 38-year-old male who presents to the clinic today for annual physical.  Past medical history significant for anxiety, depression, GERD, and herpes simplex.    He has concerns about decreased concentration and hard time focusing at work and at home.  He states he has been having issues with procrastination as well.  He is wondering if he has underlying ADHD.    Underlying anxiety and depression.  Stopped Prozac 3 to 4 months.  He has not noticed much difference in his mood since stopping the medication.  He does have hydroxyzine that he uses as needed    Currently decreasing his use on omeprazole.    Utilizing valacyclovir as needed for herpes simplex.    He has been having excessive daytime somnolence.  Feeling tired during the day even if he sleeps 8 to 9 hours a day.  He states that he could fall asleep easily in a recliner in a chair.  Unsure if he snores.    Healthy Habits:     Getting at least 3 servings of Calcium per day:  NO    Bi-annual eye exam:  NO    Dental care twice a year:  Yes    Sleep apnea or symptoms of sleep apnea:  Daytime drowsiness    Diet:  Regular (no restrictions)    Frequency of exercise:  None    Taking medications regularly:  Yes    Medication side effects:  None    Additional concerns today:  Yes                      Social History     Tobacco Use    Smoking status: Never     Passive exposure: Never    Smokeless tobacco: Never   Substance Use Topics    Alcohol use: Yes     Alcohol/week: 12.0 standard drinks of alcohol             10/25/2023    12:30 PM   Alcohol Use   Prescreen: >3 drinks/day or >7 drinks/week? Yes   AUDIT SCORE  14       Last PSA: No results found for: \"PSA\"    Reviewed orders with patient. Reviewed health maintenance and updated orders accordingly - Yes  Lab " "work is in process    Reviewed and updated as needed this visit by clinical staff   Tobacco  Allergies  Meds              Reviewed and updated as needed this visit by Provider                 Past Medical History:   Diagnosis Date    Depression     Heartburn     Panic attack     Rash       No past surgical history on file.    Review of Systems   Constitutional:  Negative for chills and fever.   HENT:  Negative for congestion, ear pain, hearing loss and sore throat.    Eyes:  Negative for pain and visual disturbance.   Respiratory:  Negative for cough and shortness of breath.    Cardiovascular:  Negative for chest pain, palpitations and peripheral edema.   Gastrointestinal:  Negative for abdominal pain, constipation, diarrhea, heartburn, hematochezia and nausea.   Genitourinary:  Negative for dysuria, frequency, genital sores, hematuria, impotence, penile discharge and urgency.   Musculoskeletal:  Negative for arthralgias, joint swelling and myalgias.   Skin:  Negative for rash.   Neurological:  Negative for dizziness, weakness, headaches and paresthesias.   Psychiatric/Behavioral:  Negative for mood changes. The patient is not nervous/anxious.          OBJECTIVE:   /80   Pulse 102   Temp 98.5  F (36.9  C) (Oral)   Resp 16   Ht 1.702 m (5' 7\")   Wt 89.8 kg (198 lb)   SpO2 97%   BMI 31.01 kg/m      Physical Exam  Vitals and nursing note reviewed.   Constitutional:       General: He is not in acute distress.     Appearance: Normal appearance. He is well-developed and well-groomed. He is not ill-appearing or toxic-appearing.   HENT:      Head: Normocephalic and atraumatic.      Right Ear: Tympanic membrane, ear canal and external ear normal.      Left Ear: Tympanic membrane, ear canal and external ear normal.      Mouth/Throat:      Lips: Pink.      Mouth: Mucous membranes are moist.      Palate: No mass.      Pharynx: Oropharynx is clear. Uvula midline.      Tonsils: No tonsillar exudate or tonsillar " abscesses.   Eyes:      General: Lids are normal.         Right eye: No discharge.         Left eye: No discharge.   Neck:      Trachea: Trachea normal.   Cardiovascular:      Rate and Rhythm: Normal rate and regular rhythm.      Heart sounds: S1 normal and S2 normal. No murmur heard.  Pulmonary:      Effort: Pulmonary effort is normal.      Breath sounds: Normal breath sounds and air entry.   Abdominal:      General: Bowel sounds are normal. There is no distension.      Palpations: Abdomen is soft.      Tenderness: There is no abdominal tenderness. There is no guarding or rebound.   Musculoskeletal:      Cervical back: Full passive range of motion without pain and neck supple.      Right lower leg: No edema.      Left lower leg: No edema.   Lymphadenopathy:      Cervical: No cervical adenopathy.   Skin:     General: Skin is warm and dry.      Findings: No lesion or rash.   Neurological:      General: No focal deficit present.      Mental Status: He is alert.      Cranial Nerves: No cranial nerve deficit.      Gait: Gait is intact.      Deep Tendon Reflexes:      Reflex Scores:       Patellar reflexes are 2+ on the right side and 2+ on the left side.  Psychiatric:         Attention and Perception: Attention normal.         Mood and Affect: Mood normal.         Speech: Speech normal.           ASSESSMENT/PLAN:     Annual physical exam  We will check screening labs including a CBC, complete metabolic panel, lipid and A1c.  Did not wish to update vaccines today  - CBC with platelets; Future  - Comprehensive metabolic panel (BMP + Alb, Alk Phos, ALT, AST, Total. Bili, TP); Future  - Lipid panel reflex to direct LDL Fasting; Future  - Hemoglobin A1c; Future  - CBC with platelets  - Comprehensive metabolic panel (BMP + Alb, Alk Phos, ALT, AST, Total. Bili, TP)  - Lipid panel reflex to direct LDL Fasting  - Hemoglobin A1c    Hyperlipidemia LDL goal <100  We will check lipid panel.  He states that he is going to be joining  a gym to help with losing some weight.  Last lipid panel 1/2022 showed an LDL of 155, triglyceride 170, HDL 46.  - Lipid panel reflex to direct LDL Fasting; Future  - Lipid panel reflex to direct LDL Fasting    Situational depression  Stable at this time.  Discontinued Prozac 3 or 4 months ago feels that his mood is stable at this time    Anxiety  Stable at this time.  Decreased Prozac 3 to 4 months ago.  He continues with counseling.    Gastroesophageal reflux disease with esophagitis without hemorrhage  Underwent a EGD in 2020.  Noted a small diaphragmatic hernia.  He has discontinued omeprazole altogether although I recommended continuing this at least every other day to help with symptoms.  Recommend EGD in 2025    Herpes Simplex  Continue with all desciclovir as needed    Screening for diabetes mellitus  - Hemoglobin A1c; Future  - Hemoglobin A1c    Decreased attention Span  He feels that he has decreased attention span and decreased concentration.  He is having lots of procrastination and hard time getting tasks done.  He would like to be tested for ADHD.  He also discontinued his Prozac some wondering if this is contributing to worsening anxiety or depression.  Referral placed for evaluation.  - Adult Mental Health  Referral; Future    Daytime somnolence  Difficulty with daytime somnolence.  This is ongoing for some time.  Does not feel well rested.  Feels that he could fall asleep easily in a recliner in a chair.  Neck circumference 15 cm.  BMI 31.  We will check some labs including a TSH and vitamin D level.  Referral to sleep medicine for evaluation on obstructive sleep apnea.  - TSH with free T4 reflex; Future  - Vitamin D Deficiency; Future  - Adult Sleep Eval & Management  Referral; Future  - TSH with free T4 reflex  - Vitamin D Deficiency     Follow-up Visit   Expected date:  Oct 25, 2024 (Approximate)      Follow Up Appointment Details:     Follow-up with whom?: Me    Follow-Up for  "what?: Adult Preventive    How?: In Person             Follow-up Visit   Expected date:  Oct 25, 2024 (Approximate)      Follow Up Appointment Details:     Follow-up with whom?: Me    Follow-Up for what?: Adult Preventive    How?: In Person                     Current Outpatient Medications   Medication    acetaminophen (TYLENOL) 500 MG tablet    albuterol (PROAIR HFA/PROVENTIL HFA/VENTOLIN HFA) 108 (90 Base) MCG/ACT inhaler    azelastine (ASTELIN) 0.1 % nasal spray    cetirizine (ZYRTEC) 10 MG tablet    FLUoxetine (PROZAC) 10 MG capsule    hydrOXYzine (ATARAX) 25 MG tablet    ibuprofen (ADVIL/MOTRIN) 100 MG tablet    omeprazole (PRILOSEC) 20 MG DR capsule    valACYclovir (VALTREX) 500 MG tablet     No current facility-administered medications for this visit.           Patient has been advised of split billing requirements and indicates understanding: Yes      COUNSELING:   Reviewed preventive health counseling, as reflected in patient instructions       Regular exercise       Healthy diet/nutrition       Vision screening      BMI:   Estimated body mass index is 31.01 kg/m  as calculated from the following:    Height as of this encounter: 1.702 m (5' 7\").    Weight as of this encounter: 89.8 kg (198 lb).   Weight management plan: Discussed healthy diet and exercise guidelines      He reports that he has never smoked. He has never been exposed to tobacco smoke. He has never used smokeless tobacco.            Gabino England PA-C  [facility]  "

## 2023-10-26 LAB
ALBUMIN SERPL BCG-MCNC: 4.6 G/DL (ref 3.5–5.2)
ALP SERPL-CCNC: 117 U/L (ref 40–129)
ALT SERPL W P-5'-P-CCNC: 54 U/L (ref 0–70)
ANION GAP SERPL CALCULATED.3IONS-SCNC: 9 MMOL/L (ref 7–15)
AST SERPL W P-5'-P-CCNC: 29 U/L (ref 0–45)
BILIRUB SERPL-MCNC: 0.5 MG/DL
BUN SERPL-MCNC: 15.5 MG/DL (ref 6–20)
CALCIUM SERPL-MCNC: 10.1 MG/DL (ref 8.6–10)
CHLORIDE SERPL-SCNC: 100 MMOL/L (ref 98–107)
CREAT SERPL-MCNC: 1 MG/DL (ref 0.67–1.17)
DEPRECATED HCO3 PLAS-SCNC: 28 MMOL/L (ref 22–29)
EGFRCR SERPLBLD CKD-EPI 2021: >90 ML/MIN/1.73M2
GLUCOSE SERPL-MCNC: 81 MG/DL (ref 70–99)
POTASSIUM SERPL-SCNC: 4.2 MMOL/L (ref 3.4–5.3)
PROT SERPL-MCNC: 8.2 G/DL (ref 6.4–8.3)
SODIUM SERPL-SCNC: 137 MMOL/L (ref 135–145)

## 2023-11-28 ENCOUNTER — MYC MEDICAL ADVICE (OUTPATIENT)
Dept: FAMILY MEDICINE | Facility: CLINIC | Age: 38
End: 2023-11-28
Payer: COMMERCIAL

## 2023-11-28 DIAGNOSIS — L50.9 URTICARIA: ICD-10-CM

## 2023-11-28 RX ORDER — HYDROXYZINE HYDROCHLORIDE 25 MG/1
25 TABLET, FILM COATED ORAL EVERY 6 HOURS PRN
Qty: 30 TABLET | Refills: 3 | Status: SHIPPED | OUTPATIENT
Start: 2023-11-28

## 2023-12-12 DIAGNOSIS — K21.00 GASTROESOPHAGEAL REFLUX DISEASE WITH ESOPHAGITIS WITHOUT HEMORRHAGE: ICD-10-CM

## 2024-04-10 ENCOUNTER — MYC REFILL (OUTPATIENT)
Dept: FAMILY MEDICINE | Facility: CLINIC | Age: 39
End: 2024-04-10
Payer: COMMERCIAL

## 2024-04-10 DIAGNOSIS — F41.1 ANXIETY STATE: ICD-10-CM

## 2024-04-10 RX ORDER — FLUOXETINE 10 MG/1
CAPSULE ORAL
Qty: 90 CAPSULE | Refills: 1 | Status: SHIPPED | OUTPATIENT
Start: 2024-04-10 | End: 2024-05-08

## 2024-05-01 ENCOUNTER — MEDICAL CORRESPONDENCE (OUTPATIENT)
Dept: HEALTH INFORMATION MANAGEMENT | Facility: CLINIC | Age: 39
End: 2024-05-01

## 2024-05-08 ENCOUNTER — OFFICE VISIT (OUTPATIENT)
Dept: FAMILY MEDICINE | Facility: CLINIC | Age: 39
End: 2024-05-08
Payer: COMMERCIAL

## 2024-05-08 VITALS
HEIGHT: 67 IN | OXYGEN SATURATION: 97 % | HEART RATE: 77 BPM | RESPIRATION RATE: 16 BRPM | TEMPERATURE: 98.2 F | BODY MASS INDEX: 30.76 KG/M2 | WEIGHT: 196 LBS | DIASTOLIC BLOOD PRESSURE: 102 MMHG | SYSTOLIC BLOOD PRESSURE: 144 MMHG

## 2024-05-08 DIAGNOSIS — R04.0 FREQUENT EPISTAXIS: Primary | ICD-10-CM

## 2024-05-08 DIAGNOSIS — F41.1 ANXIETY STATE: ICD-10-CM

## 2024-05-08 DIAGNOSIS — I10 BENIGN ESSENTIAL HYPERTENSION: ICD-10-CM

## 2024-05-08 DIAGNOSIS — F90.9 ATTENTION DEFICIT HYPERACTIVITY DISORDER (ADHD), UNSPECIFIED ADHD TYPE: ICD-10-CM

## 2024-05-08 DIAGNOSIS — E78.5 HYPERLIPIDEMIA LDL GOAL <100: ICD-10-CM

## 2024-05-08 DIAGNOSIS — H00.011 HORDEOLUM EXTERNUM OF RIGHT UPPER EYELID: ICD-10-CM

## 2024-05-08 LAB
APTT PPP: 29 SECONDS (ref 22–38)
BASOPHILS # BLD AUTO: 0.1 10E3/UL (ref 0–0.2)
BASOPHILS NFR BLD AUTO: 1 %
EOSINOPHIL # BLD AUTO: 0.2 10E3/UL (ref 0–0.7)
EOSINOPHIL NFR BLD AUTO: 3 %
ERYTHROCYTE [DISTWIDTH] IN BLOOD BY AUTOMATED COUNT: 14.1 % (ref 10–15)
HCT VFR BLD AUTO: 47.1 % (ref 40–53)
HGB BLD-MCNC: 15.2 G/DL (ref 13.3–17.7)
IMM GRANULOCYTES # BLD: 0 10E3/UL
IMM GRANULOCYTES NFR BLD: 0 %
INR PPP: 0.96 (ref 0.85–1.15)
LYMPHOCYTES # BLD AUTO: 1.8 10E3/UL (ref 0.8–5.3)
LYMPHOCYTES NFR BLD AUTO: 27 %
MCH RBC QN AUTO: 26.1 PG (ref 26.5–33)
MCHC RBC AUTO-ENTMCNC: 32.3 G/DL (ref 31.5–36.5)
MCV RBC AUTO: 81 FL (ref 78–100)
MONOCYTES # BLD AUTO: 0.6 10E3/UL (ref 0–1.3)
MONOCYTES NFR BLD AUTO: 9 %
NEUTROPHILS # BLD AUTO: 4 10E3/UL (ref 1.6–8.3)
NEUTROPHILS NFR BLD AUTO: 60 %
PLATELET # BLD AUTO: 298 10E3/UL (ref 150–450)
RBC # BLD AUTO: 5.83 10E6/UL (ref 4.4–5.9)
WBC # BLD AUTO: 6.7 10E3/UL (ref 4–11)

## 2024-05-08 PROCEDURE — 80048 BASIC METABOLIC PNL TOTAL CA: CPT | Performed by: PHYSICIAN ASSISTANT

## 2024-05-08 PROCEDURE — 85610 PROTHROMBIN TIME: CPT | Performed by: PHYSICIAN ASSISTANT

## 2024-05-08 PROCEDURE — 99215 OFFICE O/P EST HI 40 MIN: CPT | Performed by: PHYSICIAN ASSISTANT

## 2024-05-08 PROCEDURE — 85730 THROMBOPLASTIN TIME PARTIAL: CPT | Performed by: PHYSICIAN ASSISTANT

## 2024-05-08 PROCEDURE — 36415 COLL VENOUS BLD VENIPUNCTURE: CPT | Performed by: PHYSICIAN ASSISTANT

## 2024-05-08 PROCEDURE — 80061 LIPID PANEL: CPT | Performed by: PHYSICIAN ASSISTANT

## 2024-05-08 PROCEDURE — G2211 COMPLEX E/M VISIT ADD ON: HCPCS | Performed by: PHYSICIAN ASSISTANT

## 2024-05-08 PROCEDURE — 85025 COMPLETE CBC W/AUTO DIFF WBC: CPT | Performed by: PHYSICIAN ASSISTANT

## 2024-05-08 PROCEDURE — 84443 ASSAY THYROID STIM HORMONE: CPT | Performed by: PHYSICIAN ASSISTANT

## 2024-05-08 RX ORDER — ERYTHROMYCIN 5 MG/G
0.5 OINTMENT OPHTHALMIC 2 TIMES DAILY
Qty: 3.5 G | Refills: 0 | Status: SHIPPED | OUTPATIENT
Start: 2024-05-08

## 2024-05-08 RX ORDER — LISINOPRIL 20 MG/1
20 TABLET ORAL DAILY
Qty: 90 TABLET | Refills: 3 | Status: CANCELLED | OUTPATIENT
Start: 2024-05-08

## 2024-05-08 RX ORDER — DEXTROAMPHETAMINE SACCHARATE, AMPHETAMINE ASPARTATE, DEXTROAMPHETAMINE SULFATE AND AMPHETAMINE SULFATE 2.5; 2.5; 2.5; 2.5 MG/1; MG/1; MG/1; MG/1
10 TABLET ORAL DAILY
Qty: 30 TABLET | Refills: 0 | Status: SHIPPED | OUTPATIENT
Start: 2024-05-08 | End: 2024-06-24

## 2024-05-08 RX ORDER — LISINOPRIL 10 MG/1
10 TABLET ORAL DAILY
Qty: 90 TABLET | Refills: 3 | Status: SHIPPED | OUTPATIENT
Start: 2024-05-08 | End: 2024-06-24

## 2024-05-08 ASSESSMENT — ENCOUNTER SYMPTOMS
FEVER: 0
HEADACHES: 0
CHILLS: 0
GASTROINTESTINAL NEGATIVE: 1
HALLUCINATIONS: 0
SLEEP DISTURBANCE: 0
NERVOUS/ANXIOUS: 1
FATIGUE: 0
FACIAL SWELLING: 0
DYSPHORIC MOOD: 0
LIGHT-HEADEDNESS: 0
PALPITATIONS: 0
HYPERACTIVE: 0
DECREASED CONCENTRATION: 1
DIZZINESS: 0
SORE THROAT: 0
CONFUSION: 0
COUGH: 0
SHORTNESS OF BREATH: 0

## 2024-05-08 NOTE — PROGRESS NOTES
Assessment & Plan     Benign essential hypertension  Blood pressure elevated at 144/102 and on recheck was 144/106.  He is otherwise asymptomatic.  No chest pain, shortness of breath, lightheaded or dizziness.  There is a's extensive family history of hypertension.  Negative neurological exam.  Blood pressures have been high out of the clinic as well.  Due to elevated blood pressure today and family history we will start him on lisinopril 10 mg daily.  Will also check a CBC, BMP and TSH today.  He is to  a home blood pressure cuff and start checking his blood pressure daily.  He is to send me blood pressure cuffs next week.  He is to let me know in 2 weeks if his blood pressure is still greater than 140/90.  - CBC with platelets and differential; Future  - Basic metabolic panel; Future  - TSH with free T4 reflex; Future  - lisinopril (ZESTRIL) 10 MG tablet; Take 1 tablet (10 mg) by mouth daily  - CBC with platelets and differential  - Basic metabolic panel  - TSH with free T4 reflex    Frequent epistaxis  Frequent nosebleeds since childhood.  Suspect that this is due to dry air.  Continue with modifiers.  He is not using Afrin or nasal decongestants that could be contributing to this.  Recommended saline sprays and topical saline ointments.  Will check PT, INR and complete metabolic panel today.  I do not suspect his elevated blood pressure is associated with his nosebleeds.  - Partial thromboplastin time; Future  - INR; Future  - CBC with platelets and differential; Future  - Partial thromboplastin time  - INR  - CBC with platelets and differential    Anxiety  Sending anxiety due to the death of his girlfriend in November.  Continuing to grieve.  He is doing counseling.  No suicidal thoughts or ideations.  Will increase fluoxetine to 20 mg daily.  Side effects of the medication increase was discussed.  Follow-up in 1 month  - FLUoxetine (PROZAC) 20 MG capsule; Take 1 capsule (20 mg) by mouth daily TAKE 1  CAPSULE(10 MG) BY MOUTH DAILY    Attention deficit hyperactivity disorder (ADHD), unspecified ADHD type  Recently underwent evaluation for ADHD 5/1/2024.  He was diagnosed with ADHD with inattentiveness.  He was diagnosed with ADHD as a child and was on stimulants for short period of time.  He is having difficulty staying on task, disorganized, and motivation.  It is affecting him at work and at home.  We discussed nonstimulant versus stimulant medication and he would like to trial another stimulant.  Because his blood pressure is elevated at 144/102 I would like him to try to get his blood pressure down prior to starting medication.  Once his blood pressure is less than 140/90 he can start the Adderall 10 mg daily.  Side effects of the medication discussed.  Controlled substance agreement updated.  PDMP reviewed.  He is to follow-up in 1 month.  - amphetamine-dextroamphetamine (ADDERALL) 10 MG tablet; Take 1 tablet (10 mg) by mouth daily    Hordeolum externum of right upper eyelid  Hordeolum to right upper eyelid.  Nonpainful no drainage.  Has had for about 2 months.  Will try erythromycin.  If not better recommended following up with ophthalmology.  - erythromycin (ROMYCIN) 5 MG/GM ophthalmic ointment; Place 0.5 inches into the right eye 2 times daily    Hyperlipidemia LDL goal <100  Last lipid panel was quite elevated with a triglyceride of 679.  Will recheck lipid panel today since we are drawing labs.  - Lipid panel reflex to direct LDL Fasting    Total time spent was 42 minutes including reviewing records prior to arrival, consultation, placing orders, education, and reviewing the plan of care on the date of service.    The longitudinal plan of care for the diagnosis(es)/condition(s) as documented were addressed during this visit. Due to the added complexity in care, I will continue to support Alireza in the subsequent management and with ongoing continuity of care.     Follow-up Visit   Expected date:  Jun 08,  "2024 (Approximate)      Follow Up Appointment Details:     Follow-up with whom?: Me    Follow-Up for what?: Chronic Disease f/u    Chronic Disease f/u: General (Other)    How?: In Person    Is this an as-needed follow-up?: Yes                     Current Outpatient Medications   Medication Sig Dispense Refill    acetaminophen (TYLENOL) 500 MG tablet Take 500-1,000 mg by mouth every 6 hours as needed for mild pain      albuterol (PROAIR HFA/PROVENTIL HFA/VENTOLIN HFA) 108 (90 Base) MCG/ACT inhaler Inhale 2 puffs into the lungs every 4 hours as needed for shortness of breath / dyspnea or wheezing 18 g 1    amphetamine-dextroamphetamine (ADDERALL) 10 MG tablet Take 1 tablet (10 mg) by mouth daily 30 tablet 0    azelastine (ASTELIN) 0.1 % nasal spray Spray 1 spray into both nostrils 2 times daily 30 mL 3    cetirizine (ZYRTEC) 10 MG tablet Take 10 mg by mouth daily      erythromycin (ROMYCIN) 5 MG/GM ophthalmic ointment Place 0.5 inches into the right eye 2 times daily 3.5 g 0    FLUoxetine (PROZAC) 20 MG capsule Take 1 capsule (20 mg) by mouth daily TAKE 1 CAPSULE(10 MG) BY MOUTH DAILY 90 capsule 3    hydrOXYzine (ATARAX) 25 MG tablet Take 1 tablet (25 mg) by mouth every 6 hours as needed for itching 30 tablet 3    ibuprofen (ADVIL/MOTRIN) 100 MG tablet Take 100 mg by mouth every 4 hours as needed      lisinopril (ZESTRIL) 10 MG tablet Take 1 tablet (10 mg) by mouth daily 90 tablet 3    omeprazole (PRILOSEC) 20 MG DR capsule TAKE 1 CAPSULE(20 MG) BY MOUTH DAILY AS NEEDED FOR REFLUX OR SYMPTOMS 90 capsule 2    valACYclovir (VALTREX) 500 MG tablet Take 1 tablet (500 mg) by mouth daily 1 daily for 5 days during outbreaks 30 tablet 0     No current facility-administered medications for this visit.           BMI  Estimated body mass index is 30.7 kg/m  as calculated from the following:    Height as of this encounter: 1.702 m (5' 7\").    Weight as of this encounter: 88.9 kg (196 lb).   Weight management plan: Discussed " healthy diet and exercise guidelines        Subjective   Alireza is a 39 year old, presenting for the following health issues:  Epistaxis (Pt reports he has been dealing with heavy to moderate nose bleeds for some time. Particularly bad this time of year. Sometimes can not even get through a shower or lifting something heavy. Lasting about 30 min sometimes), Stye / Hordeolum Evaluation (Pt reports stye bump from over 1-2 months ago not going away), and Recheck Medication (Pt also reports he would like to discuss increasing Prozac dose and possible adhd.)        5/8/2024     8:46 AM   Additional Questions   Roomed by Sandhya Marrero   Accompanied by none     Alireza is a pleasant 39-year-old male who presents to the clinic today for follow-up on multiple concerns.  He is here to discuss anxiety/depression, ADHD, high blood pressure, nosebleeds, and a stye to his right eye.    Unfortunately he lost his girlfriend in November and his mood has not been doing very well.  He has been seeing a therapist which has been helpful.  He denies any suicidal thoughts or thoughts of harming himself or others.  He is interested in increasing his dose of medication.  Currently is on Prozac 10 mg daily.    He recently underwent evaluation for ADHD.  He was diagnosed with ADHD when he was a child and was on stimulants at that time.  He was diagnosed with ADHD through evaluation on 5/1/24.  He did bring in paperwork for this.  He would like to start on ADHD medication to help with his focus, attention, and his organizational skills.  This is affecting him at home and at work.    He also has been having nosebleeds for many years.  He states he has nosebleeds since childhood.  Typically is from the right nare.  He was having 1-2 nosebleeds a month.  It has gotten a little bit better.  Typically nosebleeds are worse in the spring.  Sometimes does take 30 to 40 minutes to stop the nosebleeding.  Currently is doing pressure and using paper towels  to help with the bleeding.  There is no family history of clotting disorder that he is aware of.  He is not using any nasal sprays at this time.    He does have a hordeolum to his right eye.  He is had this for about 2 months.  He has been doing warm compresses to the area.  There is no associated pain or drainage to it.  No visual changes    Blood pressure has been elevated.  There is a family history of hypertension.  He denies any chest pain, shortness of breath, lightheaded or dizziness.  Nosebleeds do not correlate with the timing of his elevated blood pressure.  Blood pressures have been on the higher end.  Initial blood pressure was 144/102.  Pulm blood pressure is outside of the clinic have also been slightly elevated as well.    History of Present Illness       Mental Health Follow-up:  Patient presents to follow-up on Depression.Patient's depression since last visit has been:  Medium  The patient is having other symptoms associated with depression.      Any significant life events: grief or loss  Patient is not feeling anxious or having panic attacks.  Patient has concerns about alcohol or drug use.    Reason for visit:  Bump on eye lid. Bleeding nose.  Symptom onset:  More than a month  Symptoms include:  Had a sty.. bump did not go away. Have severe bloody noses.  Symptom intensity:  Moderate  Symptom progression:  Staying the same  Had these symptoms before:  Yes  Has tried/received treatment for these symptoms:  Yes  Previous treatment was successful:  No  What makes it worse:  No  What makes it better:  Was told to eat more green vegetables and has helped but not recently.    He eats 0-1 servings of fruits and vegetables daily.He consumes 2 sweetened beverage(s) daily.He exercises with enough effort to increase his heart rate 20 to 29 minutes per day.  He exercises with enough effort to increase his heart rate 3 or less days per week.   He is taking medications regularly.         Review of  "Systems  Review of Systems   Constitutional:  Negative for chills, fatigue and fever.   HENT:  Positive for nosebleeds. Negative for congestion, ear discharge, ear pain, facial swelling and sore throat.    Respiratory:  Negative for cough and shortness of breath.    Cardiovascular:  Negative for chest pain and palpitations.   Gastrointestinal: Negative.    Skin:  Negative for rash.   Neurological:  Negative for dizziness, light-headedness and headaches.   Psychiatric/Behavioral:  Positive for decreased concentration. Negative for confusion, dysphoric mood, hallucinations, sleep disturbance and suicidal ideas. The patient is nervous/anxious. The patient is not hyperactive.            Objective    BP (!) 144/102 (BP Location: Right arm, Patient Position: Sitting, Cuff Size: Adult Regular)   Pulse 77   Temp 98.2  F (36.8  C) (Oral)   Resp 16   Ht 1.702 m (5' 7\")   Wt 88.9 kg (196 lb)   SpO2 97%   BMI 30.70 kg/m    Body mass index is 30.7 kg/m .  Physical Exam  Vitals and nursing note reviewed.   Constitutional:       General: He is not in acute distress.     Appearance: Normal appearance. He is not ill-appearing, toxic-appearing or diaphoretic.   HENT:      Head: Normocephalic and atraumatic.   Eyes:      General:         Right eye: No discharge.         Left eye: No discharge.      Conjunctiva/sclera: Conjunctivae normal.      Pupils: Pupils are equal, round, and reactive to light.        Comments: Hordeolum to right upper eyelid.  Nontender.   Cardiovascular:      Rate and Rhythm: Normal rate and regular rhythm.      Heart sounds: No murmur heard.     No friction rub. No gallop.   Pulmonary:      Effort: Pulmonary effort is normal.      Breath sounds: No wheezing, rhonchi or rales.   Skin:     General: Skin is warm and dry.   Neurological:      General: No focal deficit present.      Mental Status: He is alert and oriented to person, place, and time.      Motor: No weakness.      Coordination: Coordination " normal.      Gait: Gait normal.   Psychiatric:         Mood and Affect: Mood normal.         Behavior: Behavior normal.         Thought Content: Thought content normal.         Judgment: Judgment normal.                Signed Electronically by: Gabino England PA-C

## 2024-05-08 NOTE — PATIENT INSTRUCTIONS
Check blood pressures at home after starting Lisinopril. Send me some blood pressures next week    Start Adderal next week once your blood pressure is less than 140/90.    Increases Prozac to 20mg once a day    Follow up in 4 weeks for recheck

## 2024-05-08 NOTE — LETTER
[facility] PENNY [facility]  05/08/24  Patient: Alireza Jung  YOB: 1985  Medical Record Number: [id number]                                                                                  Non-Opioid Controlled Substance Agreement    This is an agreement between you and your provider regarding safe and appropriate use of controlled substances prescribed by your care team. Controlled substances are?medicines that can cause physical and mental dependence (abuse).     There are strict laws about having and using these medicines. We here at [facility] are  committed to working with you in your efforts to get better. To support you in this work, we'll help you schedule regular office appointments for medicine refills. If we must cancel or change your appointment for any reason, we'll make sure you have enough medicine to last until your next appointment.     As a Provider, I will:   Listen carefully to your concerns while treating you with respect.   Recommend a treatment plan that I believe is in your best interest and may involve therapies other than medicine.    Talk with you often about the possible benefits and the risk of harm of any medicine that we prescribe for you.  Assess the safety of this medicine and check how well it works.    Provide a plan on how to taper (discontinue or go off) using this medicine if the decision is made to stop its use.      ::  As a Patient, I understand controlled substances:     Are prescribed by my care provider to help me function or work and manage my condition(s).?  Are strong medicines and can cause serious side effects.     Need to be taken exactly as prescribed.?Combining controlled substances with certain medicines or chemicals (such as illegal drugs, alcohol, sedatives, sleeping pills, and benzodiazepines) can be dangerous or even fatal.? If I stop taking my medicines suddenly, I may have severe withdrawal symptoms.     The risks, benefits,  and side effects of these medicine(s) were explained to me. I agree that:    I will take part in other treatments as advised by my care team. This may be psychiatry or counseling, physical therapy, behavioral therapy, group treatment or a referral to specialist.    I will keep all my appointments and understand this is part of the monitoring of controlled substances.?My care team may require an office visit for EVERY controlled substance refill. If I miss appointments or don t follow instructions, my care team may stop my medicine    I will take my medicines as prescribed. I will not change the dose or schedule unless my care team tells me to. There will be no refills if I run out early.      I may be asked to come to the clinic and complete a urine drug test or complete a pill count. If I don t give a urine sample or participate in a pill count, the care team may stop my medicine.    I will only receive controlled substance prescriptions from this clinic. If I am treated by another provider, I will tell them that I am taking controlled substances and that I have a treatment agreement with this provider. I will inform my [facility] care team within one business day if I am given a prescription for any controlled substance by another healthcare provider. My [facility] care team can contact other providers and pharmacists about my use of any medicines.    It is up to me to make sure that I don't run out of my medicines on weekends or holidays.?If my care team is willing to refill my prescription without a visit, I must request refills only during office hours. Refills may take up to 3 business days to process. I will use one pharmacy to fill all my controlled substance prescriptions. I will notify the clinic about any changes to my insurance or medicine availability.    I am responsible for my prescriptions. If the medicine/prescription is lost, stolen or destroyed, it will not be replaced.?I also agree  not to share controlled substance medicines with anyone.     I am aware I should not use any illegal or recreational drugs. I agree not to drink alcohol unless my care team says I can.     If I enroll in the [place] [facility] Cannabis program, I will tell my care team before my next refill.    I will tell my care team right away if I become pregnant, have a new medical problem treated outside of my regular clinic, or have a change in my medicines.     I understand that this medicine can affect my thinking, judgment and reaction time.? Alcohol and drugs affect the brain and body, which can affect the safety of my driving. Being under the influence of alcohol or drugs can affect my decision-making, behaviors, personal safety and the safety of others. Driving while impaired (DWI) can occur if a person is driving, operating or in physical control of a car, motorcycle, boat, snowmobile, ATV, motorbike, off-road vehicle or any other motor vehicle ([place] Statute 169A.20). I understand the risk if I choose to drive or operate any vehicle or machinery.    I understand that if I do not follow any of the conditions above, my prescriptions or treatment may be stopped or changed.   I agree that my provider, clinic care team and pharmacy may work with any city, state or federal law enforcement agency that investigates the misuse, sale or other diversion of my controlled medicine. I will allow my provider to discuss my care with, or share a copy of, this agreement with any other treating provider, pharmacy or emergency room where I receive care.     I have read this agreement and have asked questions about anything I did not understand.    ________________________________________________________  Patient Signature - Alireza Jung     ___________________                   Date     ________________________________________________________  Provider Signature - Gabino England PA-C       ___________________                   Date      ________________________________________________________  Witness Signature (required if provider not present while patient signing)          ___________________                   Date

## 2024-05-09 LAB
ANION GAP SERPL CALCULATED.3IONS-SCNC: 10 MMOL/L (ref 7–15)
BUN SERPL-MCNC: 21.4 MG/DL (ref 6–20)
CALCIUM SERPL-MCNC: 9.7 MG/DL (ref 8.6–10)
CHLORIDE SERPL-SCNC: 102 MMOL/L (ref 98–107)
CHOLEST SERPL-MCNC: 273 MG/DL
CREAT SERPL-MCNC: 1 MG/DL (ref 0.67–1.17)
DEPRECATED HCO3 PLAS-SCNC: 25 MMOL/L (ref 22–29)
EGFRCR SERPLBLD CKD-EPI 2021: >90 ML/MIN/1.73M2
FASTING STATUS PATIENT QL REPORTED: ABNORMAL
FASTING STATUS PATIENT QL REPORTED: ABNORMAL
GLUCOSE SERPL-MCNC: 98 MG/DL (ref 70–99)
HDLC SERPL-MCNC: 44 MG/DL
LDLC SERPL CALC-MCNC: 169 MG/DL
NONHDLC SERPL-MCNC: 229 MG/DL
POTASSIUM SERPL-SCNC: 4.6 MMOL/L (ref 3.4–5.3)
SODIUM SERPL-SCNC: 137 MMOL/L (ref 135–145)
TRIGL SERPL-MCNC: 301 MG/DL
TSH SERPL DL<=0.005 MIU/L-ACNC: 2.09 UIU/ML (ref 0.3–4.2)

## 2024-05-21 ENCOUNTER — MYC MEDICAL ADVICE (OUTPATIENT)
Dept: FAMILY MEDICINE | Facility: CLINIC | Age: 39
End: 2024-05-21
Payer: COMMERCIAL

## 2024-05-21 DIAGNOSIS — I10 BENIGN ESSENTIAL HYPERTENSION: Primary | ICD-10-CM

## 2024-05-21 RX ORDER — LISINOPRIL 20 MG/1
20 TABLET ORAL DAILY
Qty: 90 TABLET | Refills: 1 | Status: SHIPPED | OUTPATIENT
Start: 2024-05-21

## 2024-06-24 ENCOUNTER — OFFICE VISIT (OUTPATIENT)
Dept: FAMILY MEDICINE | Facility: CLINIC | Age: 39
End: 2024-06-24
Attending: PHYSICIAN ASSISTANT
Payer: COMMERCIAL

## 2024-06-24 VITALS
SYSTOLIC BLOOD PRESSURE: 114 MMHG | BODY MASS INDEX: 29.82 KG/M2 | DIASTOLIC BLOOD PRESSURE: 80 MMHG | HEART RATE: 74 BPM | RESPIRATION RATE: 16 BRPM | TEMPERATURE: 98.7 F | WEIGHT: 190 LBS | HEIGHT: 67 IN | OXYGEN SATURATION: 98 %

## 2024-06-24 DIAGNOSIS — F90.9 ATTENTION DEFICIT HYPERACTIVITY DISORDER (ADHD), UNSPECIFIED ADHD TYPE: Primary | ICD-10-CM

## 2024-06-24 DIAGNOSIS — I10 BENIGN ESSENTIAL HYPERTENSION: ICD-10-CM

## 2024-06-24 DIAGNOSIS — F41.1 ANXIETY STATE: ICD-10-CM

## 2024-06-24 PROCEDURE — 99214 OFFICE O/P EST MOD 30 MIN: CPT | Performed by: PHYSICIAN ASSISTANT

## 2024-06-24 RX ORDER — DEXTROAMPHETAMINE SACCHARATE, AMPHETAMINE ASPARTATE MONOHYDRATE, DEXTROAMPHETAMINE SULFATE AND AMPHETAMINE SULFATE 2.5; 2.5; 2.5; 2.5 MG/1; MG/1; MG/1; MG/1
10 CAPSULE, EXTENDED RELEASE ORAL DAILY
Qty: 30 CAPSULE | Refills: 0 | Status: SHIPPED | OUTPATIENT
Start: 2024-06-24 | End: 2024-07-23

## 2024-06-24 NOTE — PROGRESS NOTES
Assessment & Plan     Attention deficit hyperactivity disorder (ADHD), unspecified ADHD type  Overall doing well with Adderall 10 mg immediate release.  Feels that it is a little strong when he first takes it in the morning.  Focus and attention are better.  He feels that the immediate release is maybe a little too strong so we will switch him to Adderall extended release 10 mg daily.  He is to follow-up with me in 1 to 2 months if needed otherwise we will see him back in 6 months.  PDMP reviewed no red flags.  Controlled substance agreement is up-to-date  - amphetamine-dextroamphetamine (ADDERALL XR) 10 MG 24 hr capsule; Take 1 capsule (10 mg) by mouth daily    Benign essential hypertension  Blood pressure much better at 114/80.  Continue with lisinopril 20 mg daily.    Anxiety  Increase Prozac to 20 mg about a month ago.  He does note that his mood has significantly improved since increasing the dose.  No side effects of the medication.  Continue with current dose.     Follow-up Visit   Expected date:  Dec 24, 2024 (Approximate)      Follow Up Appointment Details:     Follow-up with whom?: Me    Follow-Up for what?: Chronic Disease f/u    Chronic Disease f/u: General (Other)    How?: In Person                     Current Outpatient Medications   Medication Sig Dispense Refill    acetaminophen (TYLENOL) 500 MG tablet Take 500-1,000 mg by mouth every 6 hours as needed for mild pain      albuterol (PROAIR HFA/PROVENTIL HFA/VENTOLIN HFA) 108 (90 Base) MCG/ACT inhaler Inhale 2 puffs into the lungs every 4 hours as needed for shortness of breath / dyspnea or wheezing 18 g 1    amphetamine-dextroamphetamine (ADDERALL XR) 10 MG 24 hr capsule Take 1 capsule (10 mg) by mouth daily 30 capsule 0    azelastine (ASTELIN) 0.1 % nasal spray Spray 1 spray into both nostrils 2 times daily 30 mL 3    cetirizine (ZYRTEC) 10 MG tablet Take 10 mg by mouth daily      erythromycin (ROMYCIN) 5 MG/GM ophthalmic ointment Place 0.5 inches  into the right eye 2 times daily 3.5 g 0    FLUoxetine (PROZAC) 20 MG capsule Take 1 capsule (20 mg) by mouth daily TAKE 1 CAPSULE(10 MG) BY MOUTH DAILY 90 capsule 3    hydrOXYzine (ATARAX) 25 MG tablet Take 1 tablet (25 mg) by mouth every 6 hours as needed for itching 30 tablet 3    ibuprofen (ADVIL/MOTRIN) 100 MG tablet Take 100 mg by mouth every 4 hours as needed      lisinopril (ZESTRIL) 20 MG tablet Take 1 tablet (20 mg) by mouth daily 90 tablet 1    omeprazole (PRILOSEC) 20 MG DR capsule TAKE 1 CAPSULE(20 MG) BY MOUTH DAILY AS NEEDED FOR REFLUX OR SYMPTOMS 90 capsule 2    valACYclovir (VALTREX) 500 MG tablet Take 1 tablet (500 mg) by mouth daily 1 daily for 5 days during outbreaks 30 tablet 0     No current facility-administered medications for this visit.         Subjective   Alireza is a 39 year old, presenting for the following health issues:  RECHECK (Medication follow up for HTN and ADHD meds. Pt reports ADHD meds wear off around 5-6pm. Pt wondering about exchanging it for an extended release?)      6/24/2024     2:37 PM   Additional Questions   Roomed by Sandhya Marrero   Accompanied by none     Alireza is a pleasant 39-year-old male who presents to the clinic today for follow-up on hypertension, depression, and ADHD.  About a month ago he was started on lisinopril 10 mg and titrated up to 20 mg.  Blood pressure today is much better at 114/80.  He was recently diagnosed with ADHD through E official evaluation.  A month ago he was started on Adderall 10 mg daily.  Initially he felt that the Adderall 10 mg was too much and was very jittery.  He states that has helped with his focus and attention but still feeling somewhat scatterbrained at times.  He has noted an improvement in his ADHD symptoms since starting the medication.  Last visit we also increased his Prozac to 20 mg as he was continuing to have worsening depression symptoms.  He feels since the increase of Prozac his mood is much better.  He is not  "having any side effects from any other medication and tolerating them well.    History of Present Illness       Reason for visit:  Check up on existing meds  Symptom onset:  More than a month    He eats 2-3 servings of fruits and vegetables daily.He consumes 2 sweetened beverage(s) daily.He exercises with enough effort to increase his heart rate 10 to 19 minutes per day.  He exercises with enough effort to increase his heart rate 3 or less days per week.   He is taking medications regularly.         Review of Systems   Constitutional:  Negative for chills and fever.   HENT:  Negative for ear pain and sore throat.    Eyes:  Negative for pain and visual disturbance.   Respiratory:  Negative for cough and shortness of breath.    Cardiovascular:  Negative for chest pain and palpitations.   Gastrointestinal:  Negative for abdominal pain and vomiting.   Genitourinary:  Negative for dysuria and hematuria.   Musculoskeletal:  Negative for arthralgias and back pain.   Skin:  Negative for color change and rash.   Neurological:  Negative for seizures and syncope.   Psychiatric/Behavioral:  Positive for decreased concentration and dysphoric mood. Negative for agitation, behavioral problems, confusion, self-injury, sleep disturbance and suicidal ideas. The patient is nervous/anxious.    All other systems reviewed and are negative.          Objective    /80 (BP Location: Right arm, Patient Position: Sitting, Cuff Size: Adult Regular)   Pulse 74   Temp 98.7  F (37.1  C) (Oral)   Resp 16   Ht 1.702 m (5' 7\")   Wt 86.2 kg (190 lb)   SpO2 98%   BMI 29.76 kg/m    Body mass index is 29.76 kg/m .  Physical Exam  Vitals and nursing note reviewed.   Constitutional:       General: He is not in acute distress.     Appearance: Normal appearance. He is ill-appearing. He is not toxic-appearing or diaphoretic.   HENT:      Head: Normocephalic and atraumatic.   Eyes:      Conjunctiva/sclera: Conjunctivae normal.   Cardiovascular:    "   Rate and Rhythm: Normal rate and regular rhythm.      Heart sounds: No murmur heard.     No friction rub. No gallop.   Pulmonary:      Effort: Pulmonary effort is normal.      Breath sounds: No wheezing, rhonchi or rales.   Skin:     General: Skin is warm and dry.      Findings: No rash.   Neurological:      Mental Status: He is alert.   Psychiatric:         Mood and Affect: Mood normal.         Behavior: Behavior normal.         Thought Content: Thought content normal.         Judgment: Judgment normal.                 Signed Electronically by: Gabino England PA-C

## 2024-06-25 ASSESSMENT — ENCOUNTER SYMPTOMS
AGITATION: 0
ARTHRALGIAS: 0
SEIZURES: 0
SLEEP DISTURBANCE: 0
EYE PAIN: 0
SHORTNESS OF BREATH: 0
COLOR CHANGE: 0
DYSURIA: 0
SORE THROAT: 0
NERVOUS/ANXIOUS: 1
PALPITATIONS: 0
DYSPHORIC MOOD: 1
DECREASED CONCENTRATION: 1
VOMITING: 0
BACK PAIN: 0
ABDOMINAL PAIN: 0
CHILLS: 0
COUGH: 0
HEMATURIA: 0
CONFUSION: 0
FEVER: 0

## 2024-07-22 ENCOUNTER — MYC MEDICAL ADVICE (OUTPATIENT)
Dept: FAMILY MEDICINE | Facility: CLINIC | Age: 39
End: 2024-07-22
Payer: COMMERCIAL

## 2024-07-22 DIAGNOSIS — F90.9 ATTENTION DEFICIT HYPERACTIVITY DISORDER (ADHD), UNSPECIFIED ADHD TYPE: Primary | ICD-10-CM

## 2024-07-23 RX ORDER — DEXTROAMPHETAMINE SACCHARATE, AMPHETAMINE ASPARTATE MONOHYDRATE, DEXTROAMPHETAMINE SULFATE AND AMPHETAMINE SULFATE 5; 5; 5; 5 MG/1; MG/1; MG/1; MG/1
20 CAPSULE, EXTENDED RELEASE ORAL DAILY
Qty: 30 CAPSULE | Refills: 0 | Status: SHIPPED | OUTPATIENT
Start: 2024-07-23 | End: 2024-08-26

## 2024-08-26 ENCOUNTER — MYC REFILL (OUTPATIENT)
Dept: FAMILY MEDICINE | Facility: CLINIC | Age: 39
End: 2024-08-26
Payer: COMMERCIAL

## 2024-08-26 DIAGNOSIS — F90.9 ATTENTION DEFICIT HYPERACTIVITY DISORDER (ADHD), UNSPECIFIED ADHD TYPE: ICD-10-CM

## 2024-08-26 RX ORDER — DEXTROAMPHETAMINE SACCHARATE, AMPHETAMINE ASPARTATE MONOHYDRATE, DEXTROAMPHETAMINE SULFATE AND AMPHETAMINE SULFATE 5; 5; 5; 5 MG/1; MG/1; MG/1; MG/1
20 CAPSULE, EXTENDED RELEASE ORAL DAILY
Qty: 30 CAPSULE | Refills: 0 | Status: SHIPPED | OUTPATIENT
Start: 2024-08-26 | End: 2024-09-23

## 2024-09-05 ENCOUNTER — MYC REFILL (OUTPATIENT)
Dept: FAMILY MEDICINE | Facility: CLINIC | Age: 39
End: 2024-09-05
Payer: COMMERCIAL

## 2024-09-05 DIAGNOSIS — B00.9 HERPES SIMPLEX VIRUS (HSV) INFECTION: ICD-10-CM

## 2024-09-06 RX ORDER — VALACYCLOVIR HYDROCHLORIDE 500 MG/1
500 TABLET, FILM COATED ORAL DAILY
Qty: 30 TABLET | Refills: 0 | Status: SHIPPED | OUTPATIENT
Start: 2024-09-06

## 2024-09-23 ENCOUNTER — MYC REFILL (OUTPATIENT)
Dept: FAMILY MEDICINE | Facility: CLINIC | Age: 39
End: 2024-09-23
Payer: COMMERCIAL

## 2024-09-23 DIAGNOSIS — F90.9 ATTENTION DEFICIT HYPERACTIVITY DISORDER (ADHD), UNSPECIFIED ADHD TYPE: ICD-10-CM

## 2024-09-23 RX ORDER — DEXTROAMPHETAMINE SACCHARATE, AMPHETAMINE ASPARTATE MONOHYDRATE, DEXTROAMPHETAMINE SULFATE AND AMPHETAMINE SULFATE 5; 5; 5; 5 MG/1; MG/1; MG/1; MG/1
20 CAPSULE, EXTENDED RELEASE ORAL DAILY
Qty: 30 CAPSULE | Refills: 0 | Status: SHIPPED | OUTPATIENT
Start: 2024-09-23

## 2024-09-25 ENCOUNTER — PATIENT OUTREACH (OUTPATIENT)
Dept: CARE COORDINATION | Facility: CLINIC | Age: 39
End: 2024-09-25
Payer: COMMERCIAL

## 2024-10-09 ENCOUNTER — PATIENT OUTREACH (OUTPATIENT)
Dept: CARE COORDINATION | Facility: CLINIC | Age: 39
End: 2024-10-09
Payer: COMMERCIAL

## 2024-10-15 DIAGNOSIS — K21.00 GASTROESOPHAGEAL REFLUX DISEASE WITH ESOPHAGITIS WITHOUT HEMORRHAGE: ICD-10-CM

## 2024-10-27 ENCOUNTER — MYC REFILL (OUTPATIENT)
Dept: FAMILY MEDICINE | Facility: CLINIC | Age: 39
End: 2024-10-27
Payer: COMMERCIAL

## 2024-10-27 DIAGNOSIS — F90.9 ATTENTION DEFICIT HYPERACTIVITY DISORDER (ADHD), UNSPECIFIED ADHD TYPE: ICD-10-CM

## 2024-10-27 RX ORDER — DEXTROAMPHETAMINE SACCHARATE, AMPHETAMINE ASPARTATE MONOHYDRATE, DEXTROAMPHETAMINE SULFATE AND AMPHETAMINE SULFATE 5; 5; 5; 5 MG/1; MG/1; MG/1; MG/1
20 CAPSULE, EXTENDED RELEASE ORAL DAILY
Qty: 30 CAPSULE | Refills: 0 | Status: CANCELLED | OUTPATIENT
Start: 2024-10-27

## 2024-10-28 RX ORDER — DEXTROAMPHETAMINE SACCHARATE, AMPHETAMINE ASPARTATE MONOHYDRATE, DEXTROAMPHETAMINE SULFATE AND AMPHETAMINE SULFATE 5; 5; 5; 5 MG/1; MG/1; MG/1; MG/1
20 CAPSULE, EXTENDED RELEASE ORAL DAILY
Qty: 30 CAPSULE | Refills: 0 | Status: SHIPPED | OUTPATIENT
Start: 2024-11-27 | End: 2024-12-27

## 2024-10-28 RX ORDER — DEXTROAMPHETAMINE SACCHARATE, AMPHETAMINE ASPARTATE MONOHYDRATE, DEXTROAMPHETAMINE SULFATE AND AMPHETAMINE SULFATE 5; 5; 5; 5 MG/1; MG/1; MG/1; MG/1
20 CAPSULE, EXTENDED RELEASE ORAL DAILY
Qty: 30 CAPSULE | Refills: 0 | Status: SHIPPED | OUTPATIENT
Start: 2024-10-28 | End: 2024-11-27

## 2024-10-28 RX ORDER — DEXTROAMPHETAMINE SACCHARATE, AMPHETAMINE ASPARTATE MONOHYDRATE, DEXTROAMPHETAMINE SULFATE AND AMPHETAMINE SULFATE 5; 5; 5; 5 MG/1; MG/1; MG/1; MG/1
20 CAPSULE, EXTENDED RELEASE ORAL DAILY
Qty: 30 CAPSULE | Refills: 0 | Status: SHIPPED | OUTPATIENT
Start: 2024-12-27 | End: 2025-01-26

## 2024-11-27 ENCOUNTER — MYC REFILL (OUTPATIENT)
Dept: FAMILY MEDICINE | Facility: CLINIC | Age: 39
End: 2024-11-27
Payer: COMMERCIAL

## 2024-11-27 DIAGNOSIS — F90.9 ATTENTION DEFICIT HYPERACTIVITY DISORDER (ADHD), UNSPECIFIED ADHD TYPE: ICD-10-CM

## 2024-11-27 RX ORDER — DEXTROAMPHETAMINE SACCHARATE, AMPHETAMINE ASPARTATE MONOHYDRATE, DEXTROAMPHETAMINE SULFATE AND AMPHETAMINE SULFATE 5; 5; 5; 5 MG/1; MG/1; MG/1; MG/1
20 CAPSULE, EXTENDED RELEASE ORAL DAILY
Qty: 30 CAPSULE | Refills: 0 | OUTPATIENT
Start: 2024-11-27

## 2024-12-12 DIAGNOSIS — I10 BENIGN ESSENTIAL HYPERTENSION: ICD-10-CM

## 2024-12-12 RX ORDER — LISINOPRIL 20 MG/1
20 TABLET ORAL DAILY
Qty: 90 TABLET | Refills: 0 | Status: SHIPPED | OUTPATIENT
Start: 2024-12-12

## 2025-03-24 DIAGNOSIS — K21.00 GASTROESOPHAGEAL REFLUX DISEASE WITH ESOPHAGITIS WITHOUT HEMORRHAGE: ICD-10-CM

## 2025-03-24 RX ORDER — OMEPRAZOLE 20 MG/1
CAPSULE, DELAYED RELEASE ORAL
Qty: 90 CAPSULE | Refills: 2 | Status: SHIPPED | OUTPATIENT
Start: 2025-03-24

## 2025-04-24 DIAGNOSIS — F41.1 ANXIETY STATE: ICD-10-CM

## 2025-04-25 DIAGNOSIS — K21.00 GASTROESOPHAGEAL REFLUX DISEASE WITH ESOPHAGITIS WITHOUT HEMORRHAGE: ICD-10-CM

## 2025-04-28 RX ORDER — OMEPRAZOLE 20 MG/1
CAPSULE, DELAYED RELEASE ORAL
Qty: 90 CAPSULE | Refills: 2 | OUTPATIENT
Start: 2025-04-28

## 2025-05-11 ENCOUNTER — MYC REFILL (OUTPATIENT)
Dept: FAMILY MEDICINE | Facility: CLINIC | Age: 40
End: 2025-05-11
Payer: COMMERCIAL

## 2025-05-11 DIAGNOSIS — F90.9 ATTENTION DEFICIT HYPERACTIVITY DISORDER (ADHD), UNSPECIFIED ADHD TYPE: ICD-10-CM

## 2025-05-12 RX ORDER — DEXTROAMPHETAMINE SACCHARATE, AMPHETAMINE ASPARTATE MONOHYDRATE, DEXTROAMPHETAMINE SULFATE AND AMPHETAMINE SULFATE 5; 5; 5; 5 MG/1; MG/1; MG/1; MG/1
20 CAPSULE, EXTENDED RELEASE ORAL DAILY
Qty: 30 CAPSULE | Refills: 0 | Status: SHIPPED | OUTPATIENT
Start: 2025-05-12

## 2025-05-28 ENCOUNTER — PATIENT OUTREACH (OUTPATIENT)
Dept: CARE COORDINATION | Facility: CLINIC | Age: 40
End: 2025-05-28
Payer: COMMERCIAL

## 2025-06-11 ENCOUNTER — MYC REFILL (OUTPATIENT)
Dept: FAMILY MEDICINE | Facility: CLINIC | Age: 40
End: 2025-06-11
Payer: COMMERCIAL

## 2025-06-11 DIAGNOSIS — F90.9 ATTENTION DEFICIT HYPERACTIVITY DISORDER (ADHD), UNSPECIFIED ADHD TYPE: ICD-10-CM

## 2025-06-11 RX ORDER — DEXTROAMPHETAMINE SACCHARATE, AMPHETAMINE ASPARTATE MONOHYDRATE, DEXTROAMPHETAMINE SULFATE AND AMPHETAMINE SULFATE 5; 5; 5; 5 MG/1; MG/1; MG/1; MG/1
20 CAPSULE, EXTENDED RELEASE ORAL DAILY
Qty: 30 CAPSULE | Refills: 0 | Status: SHIPPED | OUTPATIENT
Start: 2025-06-11

## 2025-06-22 ENCOUNTER — MYC REFILL (OUTPATIENT)
Dept: FAMILY MEDICINE | Facility: CLINIC | Age: 40
End: 2025-06-22
Payer: COMMERCIAL

## 2025-06-22 DIAGNOSIS — F41.1 ANXIETY STATE: ICD-10-CM

## 2025-06-25 ENCOUNTER — MYC MEDICAL ADVICE (OUTPATIENT)
Dept: FAMILY MEDICINE | Facility: CLINIC | Age: 40
End: 2025-06-25
Payer: COMMERCIAL

## 2025-06-25 DIAGNOSIS — F41.1 ANXIETY STATE: ICD-10-CM

## 2025-06-25 NOTE — TELEPHONE ENCOUNTER
Spoke with the patient's pharmacy who confirmed they do not have a fluoxetine prescription on file. Please resend this in if appropriate. Medication pended less one refill.    Deanna Downey RN  [facility]

## 2025-06-26 NOTE — TELEPHONE ENCOUNTER
Spoke with the patient to relay that his prescription was sent in. He verbalized understanding.    Deanna Downey RN  [facility]

## 2025-06-30 ENCOUNTER — OFFICE VISIT (OUTPATIENT)
Dept: FAMILY MEDICINE | Facility: CLINIC | Age: 40
End: 2025-06-30
Attending: PHYSICIAN ASSISTANT
Payer: COMMERCIAL

## 2025-06-30 VITALS
TEMPERATURE: 98.4 F | HEIGHT: 67 IN | WEIGHT: 185 LBS | BODY MASS INDEX: 29.03 KG/M2 | HEART RATE: 80 BPM | OXYGEN SATURATION: 99 % | SYSTOLIC BLOOD PRESSURE: 122 MMHG | DIASTOLIC BLOOD PRESSURE: 80 MMHG | RESPIRATION RATE: 16 BRPM

## 2025-06-30 DIAGNOSIS — K21.00 GASTROESOPHAGEAL REFLUX DISEASE WITH ESOPHAGITIS WITHOUT HEMORRHAGE: ICD-10-CM

## 2025-06-30 DIAGNOSIS — B00.9 HERPES SIMPLEX VIRUS (HSV) INFECTION: ICD-10-CM

## 2025-06-30 DIAGNOSIS — F10.90 ALCOHOL USE: ICD-10-CM

## 2025-06-30 DIAGNOSIS — I10 BENIGN ESSENTIAL HYPERTENSION: Primary | ICD-10-CM

## 2025-06-30 DIAGNOSIS — E78.5 HYPERLIPIDEMIA LDL GOAL <100: ICD-10-CM

## 2025-06-30 DIAGNOSIS — L50.9 URTICARIA: ICD-10-CM

## 2025-06-30 DIAGNOSIS — R06.2 WHEEZING: ICD-10-CM

## 2025-06-30 DIAGNOSIS — R20.2 PARESTHESIA: ICD-10-CM

## 2025-06-30 DIAGNOSIS — F41.1 ANXIETY STATE: ICD-10-CM

## 2025-06-30 DIAGNOSIS — F90.9 ATTENTION DEFICIT HYPERACTIVITY DISORDER (ADHD), UNSPECIFIED ADHD TYPE: ICD-10-CM

## 2025-06-30 LAB
ALBUMIN SERPL BCG-MCNC: 4.4 G/DL (ref 3.5–5.2)
ALP SERPL-CCNC: 90 U/L (ref 40–150)
ALT SERPL W P-5'-P-CCNC: 36 U/L (ref 0–70)
ANION GAP SERPL CALCULATED.3IONS-SCNC: 13 MMOL/L (ref 7–15)
AST SERPL W P-5'-P-CCNC: 30 U/L (ref 0–45)
BILIRUB SERPL-MCNC: 0.2 MG/DL
BUN SERPL-MCNC: 15.8 MG/DL (ref 6–20)
CALCIUM SERPL-MCNC: 10 MG/DL (ref 8.8–10.4)
CHLORIDE SERPL-SCNC: 101 MMOL/L (ref 98–107)
CHOLEST SERPL-MCNC: 265 MG/DL
CREAT SERPL-MCNC: 1.04 MG/DL (ref 0.67–1.17)
EGFRCR SERPLBLD CKD-EPI 2021: >90 ML/MIN/1.73M2
ERYTHROCYTE [DISTWIDTH] IN BLOOD BY AUTOMATED COUNT: 14.2 % (ref 10–15)
FASTING STATUS PATIENT QL REPORTED: YES
FASTING STATUS PATIENT QL REPORTED: YES
GLUCOSE SERPL-MCNC: 101 MG/DL (ref 70–99)
HCO3 SERPL-SCNC: 21 MMOL/L (ref 22–29)
HCT VFR BLD AUTO: 48.1 % (ref 40–53)
HDLC SERPL-MCNC: 37 MG/DL
HGB BLD-MCNC: 15.3 G/DL (ref 13.3–17.7)
LDLC SERPL CALC-MCNC: 163 MG/DL
MCH RBC QN AUTO: 24.9 PG (ref 26.5–33)
MCHC RBC AUTO-ENTMCNC: 31.8 G/DL (ref 31.5–36.5)
MCV RBC AUTO: 78 FL (ref 78–100)
NONHDLC SERPL-MCNC: 228 MG/DL
PLATELET # BLD AUTO: 369 10E3/UL (ref 150–450)
POTASSIUM SERPL-SCNC: 5.2 MMOL/L (ref 3.4–5.3)
PROT SERPL-MCNC: 7.5 G/DL (ref 6.4–8.3)
RBC # BLD AUTO: 6.14 10E6/UL (ref 4.4–5.9)
SODIUM SERPL-SCNC: 135 MMOL/L (ref 135–145)
TRIGL SERPL-MCNC: 324 MG/DL
TSH SERPL DL<=0.005 MIU/L-ACNC: 2.19 UIU/ML (ref 0.3–4.2)
VIT B12 SERPL-MCNC: 375 PG/ML (ref 232–1245)
WBC # BLD AUTO: 7.7 10E3/UL (ref 4–11)

## 2025-06-30 PROCEDURE — 85027 COMPLETE CBC AUTOMATED: CPT | Performed by: PHYSICIAN ASSISTANT

## 2025-06-30 PROCEDURE — 80061 LIPID PANEL: CPT | Performed by: PHYSICIAN ASSISTANT

## 2025-06-30 PROCEDURE — 84443 ASSAY THYROID STIM HORMONE: CPT | Performed by: PHYSICIAN ASSISTANT

## 2025-06-30 PROCEDURE — 3079F DIAST BP 80-89 MM HG: CPT | Performed by: PHYSICIAN ASSISTANT

## 2025-06-30 PROCEDURE — 3074F SYST BP LT 130 MM HG: CPT | Performed by: PHYSICIAN ASSISTANT

## 2025-06-30 PROCEDURE — 99214 OFFICE O/P EST MOD 30 MIN: CPT | Performed by: PHYSICIAN ASSISTANT

## 2025-06-30 PROCEDURE — 82607 VITAMIN B-12: CPT | Performed by: PHYSICIAN ASSISTANT

## 2025-06-30 PROCEDURE — 36415 COLL VENOUS BLD VENIPUNCTURE: CPT | Performed by: PHYSICIAN ASSISTANT

## 2025-06-30 PROCEDURE — 80053 COMPREHEN METABOLIC PANEL: CPT | Performed by: PHYSICIAN ASSISTANT

## 2025-06-30 PROCEDURE — G2211 COMPLEX E/M VISIT ADD ON: HCPCS | Performed by: PHYSICIAN ASSISTANT

## 2025-06-30 RX ORDER — VALACYCLOVIR HYDROCHLORIDE 500 MG/1
500 TABLET, FILM COATED ORAL DAILY
Qty: 30 TABLET | Refills: 0 | Status: SHIPPED | OUTPATIENT
Start: 2025-06-30

## 2025-06-30 RX ORDER — HYDROXYZINE HYDROCHLORIDE 25 MG/1
25 TABLET, FILM COATED ORAL EVERY 6 HOURS PRN
Qty: 30 TABLET | Refills: 11 | Status: SHIPPED | OUTPATIENT
Start: 2025-06-30

## 2025-06-30 RX ORDER — ALBUTEROL SULFATE 90 UG/1
2 INHALANT RESPIRATORY (INHALATION) EVERY 4 HOURS PRN
Qty: 18 G | Refills: 1 | Status: SHIPPED | OUTPATIENT
Start: 2025-06-30

## 2025-06-30 RX ORDER — LISINOPRIL 20 MG/1
20 TABLET ORAL DAILY
Qty: 90 TABLET | Refills: 3 | Status: SHIPPED | OUTPATIENT
Start: 2025-06-30

## 2025-06-30 RX ORDER — OMEPRAZOLE 20 MG/1
CAPSULE, DELAYED RELEASE ORAL
Qty: 90 CAPSULE | Refills: 1 | Status: SHIPPED | OUTPATIENT
Start: 2025-06-30

## 2025-06-30 NOTE — PROGRESS NOTES
Assessment & Plan     Benign essential hypertension  Blood pressure well-controlled at 122/80.  Will continue lisinopril 20 mg daily.  Will update a complete metabolic panel.  P  - Comprehensive metabolic panel (BMP + Alb, Alk Phos, ALT, AST, Total. Bili, TP); Future  - lisinopril (ZESTRIL) 20 MG tablet; Take 1 tablet (20 mg) by mouth daily.    Attention deficit hyperactivity disorder (ADHD), unspecified ADHD type  Currently on Adderall 20 mg daily.  Focus and attention are doing well.  PDMP reviewed no red flag    Anxiety  Inadvertently ran out of Prozac and was having withdrawal symptoms.  Was experiencing insomnia, restless legs, and numbness and tingling.  Since restarting the Prozac symptoms have resolved.  He feels that his anxiety is doing well on his current dose of medication.  Will continue Prozac 20 mg daily  - FLUoxetine (PROZAC) 20 MG capsule; Take 1 capsule (20 mg) by mouth daily.  - hydrOXYzine HCl (ATARAX) 25 MG tablet; Take 1 tablet (25 mg) by mouth every 6 hours as needed for itching.    Alcohol use  Has cut back on alcohol use significantly.    Paresthesia  Numbness and tingling in hands bilaterally extending into the 4th and 5th fingers.  Worse if he is sitting or in a recliner playing video games.  No injury.  No elbow pain.  Has been ongoing for about 6 months.  Negative Tinel's and Phalen test.  With his symptoms I would be concerned about ulnar nerve compression.  Recommended EMG's but would like to hold off at this time.  Will check a CBC, B12 and a TSH level.  - CBC with platelets; Future  - Vitamin B12; Future  - TSH with free T4 reflex; Future    Hyperlipidemia LDL goal <100  Last lipid panel a year ago showed an , triglyceride 301, HDL of 44.  He is currently on fish oil.  He is fasting so we will update lipid panel today  - Lipid panel reflex to direct LDL Fasting; Future  Recent Labs   Lab Test 05/08/24  0938 10/25/23  1419   CHOL 273* 307*   HDL 44 38*   * 176*   TRIG  "301* 679*     Gastroesophageal reflux disease with esophagitis without hemorrhage  Symptoms well-controlled on omeprazole.  - omeprazole (PRILOSEC) 20 MG DR capsule; TAKE 1 CAPSULE(20 MG) BY MOUTH DAILY AS NEEDED FOR REFLUX OR SYMPTOMS    Herpes Simplex  Using Valtrex as needed.  Refilled medications  - valACYclovir (VALTREX) 500 MG tablet; Take 1 tablet (500 mg) by mouth daily. 1 daily for 5 days during outbreaks    The longitudinal plan of care for the diagnosis(es)/condition(s) as documented were addressed during this visit. Due to the added complexity in care, I will continue to support Alireza in the subsequent management and with ongoing continuity of care.       Follow-up Visit   Expected date:  Dec 30, 2025 (Approximate)      Follow Up Appointment Details:     Follow-up with whom?: Me    Follow-Up for what?: Adult Preventive    How?: In Person                 BMI  Estimated body mass index is 28.98 kg/m  as calculated from the following:    Height as of this encounter: 1.702 m (5' 7\").    Weight as of this encounter: 83.9 kg (185 lb).   Weight management plan: Discussed healthy diet and exercise guidelines        Subjective   Alireza is a 40 year old, presenting for the following health issues:  Recheck Medication (Fasting med check)        6/30/2025     7:56 AM   Additional Questions   Roomed by Sandhya Marrero   Accompanied by none     Alireza is a pleasant 40-year-old male presents to the clinic today for chronic disease management follow-up.  Past medical history significant for hypertension, ADHD, anxiety/depression, and hyperlipidemia.  Overall doing well.    His last 6 months he has been noticing numbness and tingling in his hands that extend into the 4th and 5th finger bilaterally.  No elbow pain.  States that it is worse when he is resting his elbows on a recliner or playing video games.  No injury that he can think of.    History of Present Illness       Mental Health Follow-up:  Patient presents to " "follow-up on Depression.Patient's depression since last visit has been:  Better  The patient is not having other symptoms associated with depression.      Any significant life events: No  Patient is not feeling anxious or having panic attacks.  Patient has no concerns about alcohol or drug use.    Hypertension: He presents for follow up of hypertension.  He does not check blood pressure  regularly outside of the clinic. Outside blood pressures have been over 140/90. He does not follow a low salt diet.     He eats 0-1 servings of fruits and vegetables daily.He consumes 2 sweetened beverage(s) daily.He exercises with enough effort to increase his heart rate 20 to 29 minutes per day.  He exercises with enough effort to increase his heart rate 3 or less days per week.   He is taking medications regularly.            Objective    /80 (BP Location: Right arm, Patient Position: Sitting, Cuff Size: Adult Regular)   Pulse 80   Temp 98.4  F (36.9  C) (Oral)   Resp 16   Ht 1.702 m (5' 7\")   Wt 83.9 kg (185 lb)   SpO2 99%   BMI 28.98 kg/m    Body mass index is 28.98 kg/m .  Physical Exam  Vitals and nursing note reviewed.   Constitutional:       General: He is not in acute distress.     Appearance: Normal appearance. He is not ill-appearing, toxic-appearing or diaphoretic.   Cardiovascular:      Rate and Rhythm: Normal rate and regular rhythm.      Heart sounds: No murmur heard.     No friction rub. No gallop.   Pulmonary:      Effort: Pulmonary effort is normal.      Breath sounds: No wheezing, rhonchi or rales.   Neurological:      Comments: No weakness of the upper extremities bilaterally.  Strong hand grasp bilaterally.  Radial and ulnar pulses intact.  CMS to both right and left upper extremity intact.   Psychiatric:         Mood and Affect: Mood normal.         Behavior: Behavior normal.         Thought Content: Thought content normal.         Judgment: Judgment normal.              Signed Electronically by: " Gabino England PA-C

## 2025-07-01 ENCOUNTER — RESULTS FOLLOW-UP (OUTPATIENT)
Dept: FAMILY MEDICINE | Facility: CLINIC | Age: 40
End: 2025-07-01

## 2025-07-01 ENCOUNTER — MYC MEDICAL ADVICE (OUTPATIENT)
Dept: FAMILY MEDICINE | Facility: CLINIC | Age: 40
End: 2025-07-01
Payer: COMMERCIAL

## 2025-07-01 DIAGNOSIS — R20.2 NUMBNESS AND TINGLING IN BOTH HANDS: Primary | ICD-10-CM

## 2025-07-01 DIAGNOSIS — R20.0 NUMBNESS AND TINGLING IN BOTH HANDS: Primary | ICD-10-CM

## 2025-08-15 ENCOUNTER — TRANSFERRED RECORDS (OUTPATIENT)
Dept: HEALTH INFORMATION MANAGEMENT | Facility: CLINIC | Age: 40
End: 2025-08-15
Payer: COMMERCIAL